# Patient Record
Sex: MALE | Race: WHITE | NOT HISPANIC OR LATINO | Employment: OTHER | URBAN - METROPOLITAN AREA
[De-identification: names, ages, dates, MRNs, and addresses within clinical notes are randomized per-mention and may not be internally consistent; named-entity substitution may affect disease eponyms.]

---

## 2017-04-11 ENCOUNTER — COMMUNICATION - HEALTHEAST (OUTPATIENT)
Dept: FAMILY MEDICINE | Facility: CLINIC | Age: 62
End: 2017-04-11

## 2017-06-29 ENCOUNTER — COMMUNICATION - HEALTHEAST (OUTPATIENT)
Dept: FAMILY MEDICINE | Facility: CLINIC | Age: 62
End: 2017-06-29

## 2017-06-29 DIAGNOSIS — I10 HYPERTENSION: ICD-10-CM

## 2017-09-27 ENCOUNTER — COMMUNICATION - HEALTHEAST (OUTPATIENT)
Dept: FAMILY MEDICINE | Facility: CLINIC | Age: 62
End: 2017-09-27

## 2017-09-27 DIAGNOSIS — I10 HTN (HYPERTENSION): ICD-10-CM

## 2017-09-27 DIAGNOSIS — I10 HYPERTENSION: ICD-10-CM

## 2017-10-08 ENCOUNTER — RECORDS - HEALTHEAST (OUTPATIENT)
Dept: ADMINISTRATIVE | Facility: OTHER | Age: 62
End: 2017-10-08

## 2017-11-02 ENCOUNTER — OFFICE VISIT - HEALTHEAST (OUTPATIENT)
Dept: FAMILY MEDICINE | Facility: CLINIC | Age: 62
End: 2017-11-02

## 2017-11-02 DIAGNOSIS — E78.5 HYPERLIPIDEMIA: ICD-10-CM

## 2017-11-02 DIAGNOSIS — I10 HYPERTENSION: ICD-10-CM

## 2017-11-02 DIAGNOSIS — I10 ESSENTIAL HYPERTENSION, BENIGN: ICD-10-CM

## 2017-11-02 DIAGNOSIS — D86.9 SARCOIDOSIS: ICD-10-CM

## 2017-11-02 DIAGNOSIS — J30.1 ALLERGIC RHINITIS DUE TO POLLEN: ICD-10-CM

## 2017-11-02 DIAGNOSIS — Z00.00 HEALTH CARE MAINTENANCE: ICD-10-CM

## 2017-11-02 DIAGNOSIS — J45.30 MILD PERSISTENT ASTHMA WITHOUT COMPLICATION: ICD-10-CM

## 2017-11-02 DIAGNOSIS — Z12.5 SCREENING PSA (PROSTATE SPECIFIC ANTIGEN): ICD-10-CM

## 2017-11-02 DIAGNOSIS — E78.5 DYSLIPIDEMIA: ICD-10-CM

## 2017-11-02 LAB
CHOLEST SERPL-MCNC: 244 MG/DL
FASTING STATUS PATIENT QL REPORTED: YES
HDLC SERPL-MCNC: 45 MG/DL
LDLC SERPL CALC-MCNC: 172 MG/DL
PSA SERPL-MCNC: 2.7 NG/ML (ref 0–4.5)
TRIGL SERPL-MCNC: 137 MG/DL

## 2017-11-02 ASSESSMENT — MIFFLIN-ST. JEOR: SCORE: 1581.53

## 2017-11-03 ENCOUNTER — COMMUNICATION - HEALTHEAST (OUTPATIENT)
Dept: FAMILY MEDICINE | Facility: CLINIC | Age: 62
End: 2017-11-03

## 2017-11-06 ENCOUNTER — AMBULATORY - HEALTHEAST (OUTPATIENT)
Dept: FAMILY MEDICINE | Facility: CLINIC | Age: 62
End: 2017-11-06

## 2017-11-06 DIAGNOSIS — E87.5 HYPERKALEMIA: ICD-10-CM

## 2017-11-06 DIAGNOSIS — R97.20 RISING PSA LEVEL: ICD-10-CM

## 2017-11-06 DIAGNOSIS — Z12.11 COLON CANCER SCREENING: ICD-10-CM

## 2017-11-13 ENCOUNTER — COMMUNICATION - HEALTHEAST (OUTPATIENT)
Dept: FAMILY MEDICINE | Facility: CLINIC | Age: 62
End: 2017-11-13

## 2017-11-13 DIAGNOSIS — E78.5 HYPERLIPIDEMIA: ICD-10-CM

## 2017-12-08 ENCOUNTER — AMBULATORY - HEALTHEAST (OUTPATIENT)
Dept: LAB | Facility: CLINIC | Age: 62
End: 2017-12-08

## 2017-12-08 DIAGNOSIS — E87.5 HYPERKALEMIA: ICD-10-CM

## 2017-12-09 ENCOUNTER — COMMUNICATION - HEALTHEAST (OUTPATIENT)
Dept: FAMILY MEDICINE | Facility: CLINIC | Age: 62
End: 2017-12-09

## 2017-12-14 ENCOUNTER — RECORDS - HEALTHEAST (OUTPATIENT)
Dept: ADMINISTRATIVE | Facility: OTHER | Age: 62
End: 2017-12-14

## 2017-12-28 ENCOUNTER — COMMUNICATION - HEALTHEAST (OUTPATIENT)
Dept: FAMILY MEDICINE | Facility: CLINIC | Age: 62
End: 2017-12-28

## 2017-12-29 ENCOUNTER — COMMUNICATION - HEALTHEAST (OUTPATIENT)
Dept: FAMILY MEDICINE | Facility: CLINIC | Age: 62
End: 2017-12-29

## 2018-02-07 ENCOUNTER — COMMUNICATION - HEALTHEAST (OUTPATIENT)
Dept: FAMILY MEDICINE | Facility: CLINIC | Age: 63
End: 2018-02-07

## 2018-03-23 ENCOUNTER — RECORDS - HEALTHEAST (OUTPATIENT)
Dept: ADMINISTRATIVE | Facility: OTHER | Age: 63
End: 2018-03-23

## 2018-04-19 ENCOUNTER — COMMUNICATION - HEALTHEAST (OUTPATIENT)
Dept: FAMILY MEDICINE | Facility: CLINIC | Age: 63
End: 2018-04-19

## 2018-09-05 ENCOUNTER — RECORDS - HEALTHEAST (OUTPATIENT)
Dept: ADMINISTRATIVE | Facility: OTHER | Age: 63
End: 2018-09-05

## 2018-11-05 ENCOUNTER — COMMUNICATION - HEALTHEAST (OUTPATIENT)
Dept: FAMILY MEDICINE | Facility: CLINIC | Age: 63
End: 2018-11-05

## 2018-11-05 DIAGNOSIS — E78.5 HYPERLIPIDEMIA: ICD-10-CM

## 2018-11-05 DIAGNOSIS — I10 HYPERTENSION: ICD-10-CM

## 2018-11-06 ENCOUNTER — OFFICE VISIT - HEALTHEAST (OUTPATIENT)
Dept: FAMILY MEDICINE | Facility: CLINIC | Age: 63
End: 2018-11-06

## 2018-11-06 DIAGNOSIS — J45.30 MILD PERSISTENT ASTHMA WITHOUT COMPLICATION: ICD-10-CM

## 2018-11-06 DIAGNOSIS — E78.5 DYSLIPIDEMIA: ICD-10-CM

## 2018-11-06 DIAGNOSIS — D86.9 SARCOIDOSIS: ICD-10-CM

## 2018-11-06 DIAGNOSIS — Z00.00 HEALTH CARE MAINTENANCE: ICD-10-CM

## 2018-11-06 DIAGNOSIS — Z12.5 SCREENING PSA (PROSTATE SPECIFIC ANTIGEN): ICD-10-CM

## 2018-11-06 DIAGNOSIS — I10 ESSENTIAL HYPERTENSION, BENIGN: ICD-10-CM

## 2018-11-06 LAB
ALBUMIN SERPL-MCNC: 4.3 G/DL (ref 3.5–5)
ALP SERPL-CCNC: 88 U/L (ref 45–120)
ALT SERPL W P-5'-P-CCNC: 38 U/L (ref 0–45)
ANION GAP SERPL CALCULATED.3IONS-SCNC: 13 MMOL/L (ref 5–18)
AST SERPL W P-5'-P-CCNC: 33 U/L (ref 0–40)
BILIRUB SERPL-MCNC: 0.9 MG/DL (ref 0–1)
BUN SERPL-MCNC: 20 MG/DL (ref 8–22)
CALCIUM SERPL-MCNC: 10.5 MG/DL (ref 8.5–10.5)
CHLORIDE BLD-SCNC: 99 MMOL/L (ref 98–107)
CHOLEST SERPL-MCNC: 232 MG/DL
CO2 SERPL-SCNC: 27 MMOL/L (ref 22–31)
CREAT SERPL-MCNC: 0.98 MG/DL (ref 0.7–1.3)
FASTING STATUS PATIENT QL REPORTED: YES
GFR SERPL CREATININE-BSD FRML MDRD: >60 ML/MIN/1.73M2
GLUCOSE BLD-MCNC: 92 MG/DL (ref 70–125)
HDLC SERPL-MCNC: 45 MG/DL
LDLC SERPL CALC-MCNC: 166 MG/DL
POTASSIUM BLD-SCNC: 4.9 MMOL/L (ref 3.5–5)
PROT SERPL-MCNC: 7.6 G/DL (ref 6–8)
PSA SERPL-MCNC: 1.5 NG/ML (ref 0–4.5)
SODIUM SERPL-SCNC: 139 MMOL/L (ref 136–145)
TRIGL SERPL-MCNC: 106 MG/DL

## 2018-11-06 ASSESSMENT — MIFFLIN-ST. JEOR: SCORE: 1573.48

## 2018-11-07 ENCOUNTER — COMMUNICATION - HEALTHEAST (OUTPATIENT)
Dept: FAMILY MEDICINE | Facility: CLINIC | Age: 63
End: 2018-11-07

## 2018-11-13 ENCOUNTER — OFFICE VISIT - HEALTHEAST (OUTPATIENT)
Dept: ALLERGY | Facility: CLINIC | Age: 63
End: 2018-11-13

## 2018-11-13 DIAGNOSIS — J45.30 MILD PERSISTENT ASTHMA WITHOUT COMPLICATION: ICD-10-CM

## 2018-11-13 ASSESSMENT — MIFFLIN-ST. JEOR: SCORE: 1572.57

## 2018-11-14 ENCOUNTER — COMMUNICATION - HEALTHEAST (OUTPATIENT)
Dept: FAMILY MEDICINE | Facility: CLINIC | Age: 63
End: 2018-11-14

## 2019-02-07 ENCOUNTER — COMMUNICATION - HEALTHEAST (OUTPATIENT)
Dept: FAMILY MEDICINE | Facility: CLINIC | Age: 64
End: 2019-02-07

## 2019-02-07 DIAGNOSIS — E78.5 HYPERLIPIDEMIA: ICD-10-CM

## 2019-02-14 ENCOUNTER — COMMUNICATION - HEALTHEAST (OUTPATIENT)
Dept: FAMILY MEDICINE | Facility: CLINIC | Age: 64
End: 2019-02-14

## 2019-02-14 DIAGNOSIS — E78.5 HYPERLIPIDEMIA: ICD-10-CM

## 2019-02-14 DIAGNOSIS — I10 HYPERTENSION: ICD-10-CM

## 2019-10-15 ENCOUNTER — RECORDS - HEALTHEAST (OUTPATIENT)
Dept: ADMINISTRATIVE | Facility: OTHER | Age: 64
End: 2019-10-15

## 2019-11-03 ENCOUNTER — COMMUNICATION - HEALTHEAST (OUTPATIENT)
Dept: FAMILY MEDICINE | Facility: CLINIC | Age: 64
End: 2019-11-03

## 2019-11-03 DIAGNOSIS — I10 HYPERTENSION: ICD-10-CM

## 2019-11-03 DIAGNOSIS — E78.5 HYPERLIPIDEMIA: ICD-10-CM

## 2019-12-05 ENCOUNTER — OFFICE VISIT - HEALTHEAST (OUTPATIENT)
Dept: FAMILY MEDICINE | Facility: CLINIC | Age: 64
End: 2019-12-05

## 2019-12-05 DIAGNOSIS — Z12.5 SCREENING PSA (PROSTATE SPECIFIC ANTIGEN): ICD-10-CM

## 2019-12-05 DIAGNOSIS — I10 ESSENTIAL HYPERTENSION, BENIGN: ICD-10-CM

## 2019-12-05 DIAGNOSIS — I10 HYPERTENSION: ICD-10-CM

## 2019-12-05 DIAGNOSIS — E78.5 DYSLIPIDEMIA: ICD-10-CM

## 2019-12-05 LAB
ALBUMIN SERPL-MCNC: 4.5 G/DL (ref 3.5–5)
ALP SERPL-CCNC: 82 U/L (ref 45–120)
ALT SERPL W P-5'-P-CCNC: 37 U/L (ref 0–45)
ANION GAP SERPL CALCULATED.3IONS-SCNC: 14 MMOL/L (ref 5–18)
AST SERPL W P-5'-P-CCNC: 31 U/L (ref 0–40)
BILIRUB SERPL-MCNC: 1 MG/DL (ref 0–1)
BUN SERPL-MCNC: 22 MG/DL (ref 8–22)
CALCIUM SERPL-MCNC: 10.3 MG/DL (ref 8.5–10.5)
CHLORIDE BLD-SCNC: 103 MMOL/L (ref 98–107)
CHOLEST SERPL-MCNC: 228 MG/DL
CO2 SERPL-SCNC: 24 MMOL/L (ref 22–31)
CREAT SERPL-MCNC: 0.94 MG/DL (ref 0.7–1.3)
FASTING STATUS PATIENT QL REPORTED: YES
GFR SERPL CREATININE-BSD FRML MDRD: >60 ML/MIN/1.73M2
GLUCOSE BLD-MCNC: 90 MG/DL (ref 70–125)
HDLC SERPL-MCNC: 44 MG/DL
LDLC SERPL CALC-MCNC: 159 MG/DL
POTASSIUM BLD-SCNC: 4.7 MMOL/L (ref 3.5–5)
PROT SERPL-MCNC: 7.6 G/DL (ref 6–8)
PSA SERPL-MCNC: 1.7 NG/ML (ref 0–4.5)
SODIUM SERPL-SCNC: 141 MMOL/L (ref 136–145)
TRIGL SERPL-MCNC: 123 MG/DL

## 2019-12-05 ASSESSMENT — MIFFLIN-ST. JEOR: SCORE: 1593.43

## 2019-12-06 ENCOUNTER — COMMUNICATION - HEALTHEAST (OUTPATIENT)
Dept: FAMILY MEDICINE | Facility: CLINIC | Age: 64
End: 2019-12-06

## 2020-01-02 ENCOUNTER — OFFICE VISIT - HEALTHEAST (OUTPATIENT)
Dept: FAMILY MEDICINE | Facility: CLINIC | Age: 65
End: 2020-01-02

## 2020-01-02 ENCOUNTER — COMMUNICATION - HEALTHEAST (OUTPATIENT)
Dept: FAMILY MEDICINE | Facility: CLINIC | Age: 65
End: 2020-01-02

## 2020-01-02 DIAGNOSIS — I10 ESSENTIAL HYPERTENSION, BENIGN: ICD-10-CM

## 2020-01-02 LAB
ANION GAP SERPL CALCULATED.3IONS-SCNC: 13 MMOL/L (ref 5–18)
BUN SERPL-MCNC: 28 MG/DL (ref 8–22)
CALCIUM SERPL-MCNC: 10.1 MG/DL (ref 8.5–10.5)
CHLORIDE BLD-SCNC: 101 MMOL/L (ref 98–107)
CO2 SERPL-SCNC: 27 MMOL/L (ref 22–31)
CREAT SERPL-MCNC: 1.06 MG/DL (ref 0.7–1.3)
GFR SERPL CREATININE-BSD FRML MDRD: >60 ML/MIN/1.73M2
GLUCOSE BLD-MCNC: 137 MG/DL (ref 70–125)
POTASSIUM BLD-SCNC: 4.2 MMOL/L (ref 3.5–5)
SODIUM SERPL-SCNC: 141 MMOL/L (ref 136–145)

## 2020-04-27 ENCOUNTER — COMMUNICATION - HEALTHEAST (OUTPATIENT)
Dept: FAMILY MEDICINE | Facility: CLINIC | Age: 65
End: 2020-04-27

## 2020-04-27 DIAGNOSIS — E78.5 HYPERLIPIDEMIA: ICD-10-CM

## 2020-04-27 DIAGNOSIS — I10 HYPERTENSION: ICD-10-CM

## 2020-07-21 ENCOUNTER — RECORDS - HEALTHEAST (OUTPATIENT)
Dept: ADMINISTRATIVE | Facility: OTHER | Age: 65
End: 2020-07-21

## 2020-12-28 ENCOUNTER — COMMUNICATION - HEALTHEAST (OUTPATIENT)
Dept: FAMILY MEDICINE | Facility: CLINIC | Age: 65
End: 2020-12-28

## 2020-12-28 DIAGNOSIS — I10 HYPERTENSION: ICD-10-CM

## 2021-01-14 ENCOUNTER — COMMUNICATION - HEALTHEAST (OUTPATIENT)
Dept: FAMILY MEDICINE | Facility: CLINIC | Age: 66
End: 2021-01-14

## 2021-01-14 DIAGNOSIS — E78.5 HYPERLIPIDEMIA: ICD-10-CM

## 2021-01-17 ENCOUNTER — COMMUNICATION - HEALTHEAST (OUTPATIENT)
Dept: FAMILY MEDICINE | Facility: CLINIC | Age: 66
End: 2021-01-17

## 2021-01-17 DIAGNOSIS — I10 HYPERTENSION: ICD-10-CM

## 2021-03-26 ENCOUNTER — COMMUNICATION - HEALTHEAST (OUTPATIENT)
Dept: FAMILY MEDICINE | Facility: CLINIC | Age: 66
End: 2021-03-26

## 2021-03-26 DIAGNOSIS — I10 HYPERTENSION: ICD-10-CM

## 2021-04-05 ENCOUNTER — OFFICE VISIT - HEALTHEAST (OUTPATIENT)
Dept: FAMILY MEDICINE | Facility: CLINIC | Age: 66
End: 2021-04-05

## 2021-04-05 ENCOUNTER — AMBULATORY - HEALTHEAST (OUTPATIENT)
Dept: MULTI SPECIALTY CLINIC | Facility: CLINIC | Age: 66
End: 2021-04-05

## 2021-04-05 DIAGNOSIS — Z12.5 SCREENING PSA (PROSTATE SPECIFIC ANTIGEN): ICD-10-CM

## 2021-04-05 DIAGNOSIS — Z00.00 HEALTH CARE MAINTENANCE: ICD-10-CM

## 2021-04-05 DIAGNOSIS — I10 HYPERTENSION: ICD-10-CM

## 2021-04-05 DIAGNOSIS — Z12.11 SCREEN FOR COLON CANCER: ICD-10-CM

## 2021-04-05 DIAGNOSIS — E78.5 HYPERLIPIDEMIA: ICD-10-CM

## 2021-04-05 LAB
ALBUMIN SERPL-MCNC: 4.4 G/DL (ref 3.5–5)
ALP SERPL-CCNC: 97 U/L (ref 45–120)
ALT SERPL W P-5'-P-CCNC: 38 U/L (ref 0–45)
ANION GAP SERPL CALCULATED.3IONS-SCNC: 10 MMOL/L (ref 5–18)
AST SERPL W P-5'-P-CCNC: 26 U/L (ref 0–40)
BILIRUB SERPL-MCNC: 0.8 MG/DL (ref 0–1)
BUN SERPL-MCNC: 26 MG/DL (ref 8–22)
CALCIUM SERPL-MCNC: 9.5 MG/DL (ref 8.5–10.5)
CHLORIDE BLD-SCNC: 103 MMOL/L (ref 98–107)
CHOLEST SERPL-MCNC: 192 MG/DL
CO2 SERPL-SCNC: 26 MMOL/L (ref 22–31)
CREAT SERPL-MCNC: 0.95 MG/DL (ref 0.7–1.3)
FASTING STATUS PATIENT QL REPORTED: YES
GFR SERPL CREATININE-BSD FRML MDRD: >60 ML/MIN/1.73M2
GLUCOSE BLD-MCNC: 94 MG/DL (ref 70–125)
HDLC SERPL-MCNC: 41 MG/DL
LDLC SERPL CALC-MCNC: 126 MG/DL
POTASSIUM BLD-SCNC: 4.2 MMOL/L (ref 3.5–5)
PROT SERPL-MCNC: 7.2 G/DL (ref 6–8)
PSA SERPL-MCNC: 2.7 NG/ML (ref 0–4.5)
SODIUM SERPL-SCNC: 139 MMOL/L (ref 136–145)
TRIGL SERPL-MCNC: 127 MG/DL

## 2021-04-05 RX ORDER — HYDROCHLOROTHIAZIDE 25 MG/1
TABLET ORAL
Qty: 90 TABLET | Refills: 3 | Status: SHIPPED | OUTPATIENT
Start: 2021-04-05 | End: 2022-06-07

## 2021-04-05 RX ORDER — PRAVASTATIN SODIUM 40 MG
40 TABLET ORAL AT BEDTIME
Qty: 90 TABLET | Refills: 3 | Status: SHIPPED | OUTPATIENT
Start: 2021-04-05 | End: 2022-04-05

## 2021-04-05 RX ORDER — LISINOPRIL 20 MG/1
TABLET ORAL
Qty: 90 TABLET | Refills: 3 | Status: SHIPPED | OUTPATIENT
Start: 2021-04-05 | End: 2022-04-01

## 2021-04-05 ASSESSMENT — MIFFLIN-ST. JEOR: SCORE: 1570.87

## 2021-04-06 ENCOUNTER — COMMUNICATION - HEALTHEAST (OUTPATIENT)
Dept: FAMILY MEDICINE | Facility: CLINIC | Age: 66
End: 2021-04-06

## 2021-04-06 ENCOUNTER — AMBULATORY - HEALTHEAST (OUTPATIENT)
Dept: FAMILY MEDICINE | Facility: CLINIC | Age: 66
End: 2021-04-06

## 2021-04-06 DIAGNOSIS — D72.829 LEUKOCYTOSIS, UNSPECIFIED TYPE: ICD-10-CM

## 2021-04-09 LAB — COLOGUARD-ABSTRACT: NEGATIVE

## 2021-04-15 ENCOUNTER — AMBULATORY - HEALTHEAST (OUTPATIENT)
Dept: FAMILY MEDICINE | Facility: CLINIC | Age: 66
End: 2021-04-15

## 2021-04-15 ENCOUNTER — COMMUNICATION - HEALTHEAST (OUTPATIENT)
Dept: FAMILY MEDICINE | Facility: CLINIC | Age: 66
End: 2021-04-15

## 2021-04-15 DIAGNOSIS — R97.20 RISING PSA LEVEL: ICD-10-CM

## 2021-05-27 ENCOUNTER — RECORDS - HEALTHEAST (OUTPATIENT)
Dept: ADMINISTRATIVE | Facility: CLINIC | Age: 66
End: 2021-05-27

## 2021-05-28 ASSESSMENT — ASTHMA QUESTIONNAIRES: ACT_TOTALSCORE: 25

## 2021-05-29 ENCOUNTER — HEALTH MAINTENANCE LETTER (OUTPATIENT)
Age: 66
End: 2021-05-29

## 2021-05-30 ENCOUNTER — RECORDS - HEALTHEAST (OUTPATIENT)
Dept: ADMINISTRATIVE | Facility: CLINIC | Age: 66
End: 2021-05-30

## 2021-05-31 VITALS — HEIGHT: 70 IN | BODY MASS INDEX: 25.07 KG/M2 | WEIGHT: 175.1 LBS

## 2021-06-02 ENCOUNTER — RECORDS - HEALTHEAST (OUTPATIENT)
Dept: ADMINISTRATIVE | Facility: CLINIC | Age: 66
End: 2021-06-02

## 2021-06-02 VITALS — BODY MASS INDEX: 24.91 KG/M2 | HEIGHT: 70 IN | WEIGHT: 174 LBS

## 2021-06-02 VITALS — HEIGHT: 70 IN | WEIGHT: 174.2 LBS | BODY MASS INDEX: 24.94 KG/M2

## 2021-06-02 NOTE — TELEPHONE ENCOUNTER
Refill Approved    Rx renewed per Medication Renewal Policy. Medication was last renewed on .  hydroCHLOROthiazide (HYDRODIURIL) 12.5 MG tablet 90 tablet 2 2/18/2019     lisinopril (PRINIVIL,ZESTRIL) 20 MG tablet 90 tablet 2 2/18/2019     pravastatin (PRAVACHOL) 40 MG tablet 7 tablet 0 2/14/2019     Marion Guadalupe, [facility] Triage/Med Refill 11/3/2019     Requested Prescriptions   Pending Prescriptions Disp Refills     lisinopril (PRINIVIL,ZESTRIL) 20 MG tablet [Pharmacy Med Name: LISINOPRIL TAB 20MG] 90 tablet 2     Sig: TAKE 1 TABLET DAILY       Ace Inhibitors Refill Protocol Passed - 11/3/2019  9:38 AM        Passed - PCP or prescribing provider visit in past 12 months       Last office visit with prescriber/PCP: 10/14/2016 Randy Moreno MD OR same dept: Visit date not found OR same specialty: 10/14/2016 Randy Moreno MD  Last physical: 11/6/2018 Last MTM visit: Visit date not found   Next visit within 3 mo: Visit date not found  Next physical within 3 mo: Visit date not found  Prescriber OR PCP: Randy Moreno MD  Last diagnosis associated with med order: 1. Hypertension  - lisinopril (PRINIVIL,ZESTRIL) 20 MG tablet [Pharmacy Med Name: LISINOPRIL TAB 20MG]; TAKE 1 TABLET DAILY  Dispense: 90 tablet; Refill: 2  - hydroCHLOROthiazide (HYDRODIURIL) 12.5 MG tablet [Pharmacy Med Name: HYDROCHLOROT TAB 12.5MG]; TAKE 1 TABLET DAILY  Dispense: 90 tablet; Refill: 2    2. Hyperlipidemia  - pravastatin (PRAVACHOL) 40 MG tablet [Pharmacy Med Name: PRAVASTATIN  TAB 40MG]; TAKE 1 TABLET DAILY AT     BEDTIME  Dispense: 90 tablet; Refill: 2    If protocol passes may refill for 12 months if within 3 months of last provider visit (or a total of 15 months).             Passed - Serum Potassium in past 12 months     Lab Results   Component Value Date    Potassium 4.9 11/06/2018             Passed - Blood pressure filed in past 12 months     BP Readings from Last 1 Encounters:   11/13/18 120/70              Passed - Serum Creatinine in past 12 months     Creatinine   Date Value Ref Range Status   11/06/2018 0.98 0.70 - 1.30 mg/dL Final             pravastatin (PRAVACHOL) 40 MG tablet [Pharmacy Med Name: PRAVASTATIN  TAB 40MG] 90 tablet 2     Sig: TAKE 1 TABLET DAILY AT     BEDTIME       Statins Refill Protocol (Hmg CoA Reductase Inhibitors) Passed - 11/3/2019  9:38 AM        Passed - PCP or prescribing provider visit in past 12 months      Last office visit with prescriber/PCP: 10/14/2016 Randy Moreno MD OR same dept: Visit date not found OR same specialty: 10/14/2016 Randy Moreno MD  Last physical: 11/6/2018 Last MTM visit: Visit date not found   Next visit within 3 mo: Visit date not found  Next physical within 3 mo: Visit date not found  Prescriber OR PCP: Randy Moreno MD  Last diagnosis associated with med order: 1. Hypertension  - lisinopril (PRINIVIL,ZESTRIL) 20 MG tablet [Pharmacy Med Name: LISINOPRIL TAB 20MG]; TAKE 1 TABLET DAILY  Dispense: 90 tablet; Refill: 2  - hydroCHLOROthiazide (HYDRODIURIL) 12.5 MG tablet [Pharmacy Med Name: HYDROCHLOROT TAB 12.5MG]; TAKE 1 TABLET DAILY  Dispense: 90 tablet; Refill: 2    2. Hyperlipidemia  - pravastatin (PRAVACHOL) 40 MG tablet [Pharmacy Med Name: PRAVASTATIN  TAB 40MG]; TAKE 1 TABLET DAILY AT     BEDTIME  Dispense: 90 tablet; Refill: 2    If protocol passes may refill for 12 months if within 3 months of last provider visit (or a total of 15 months).             hydroCHLOROthiazide (HYDRODIURIL) 12.5 MG tablet [Pharmacy Med Name: HYDROCHLOROT TAB 12.5MG] 90 tablet 2     Sig: TAKE 1 TABLET DAILY       Diuretics/Combination Diuretics Refill Protocol  Passed - 11/3/2019  9:38 AM        Passed - Visit with PCP or prescribing provider visit in past 12 months     Last office visit with prescriber/PCP: 10/14/2016 Randy Moreno MD OR same dept: Visit date not found OR same specialty: 10/14/2016 Randy Moreno MD   Last physical: 11/6/2018 Last MTM visit: Visit date not found   Next visit within 3 mo: Visit date not found  Next physical within 3 mo: Visit date not found  Prescriber OR PCP: Randy Moreno MD  Last diagnosis associated with med order: 1. Hypertension  - lisinopril (PRINIVIL,ZESTRIL) 20 MG tablet [Pharmacy Med Name: LISINOPRIL TAB 20MG]; TAKE 1 TABLET DAILY  Dispense: 90 tablet; Refill: 2  - hydroCHLOROthiazide (HYDRODIURIL) 12.5 MG tablet [Pharmacy Med Name: HYDROCHLOROT TAB 12.5MG]; TAKE 1 TABLET DAILY  Dispense: 90 tablet; Refill: 2    2. Hyperlipidemia  - pravastatin (PRAVACHOL) 40 MG tablet [Pharmacy Med Name: PRAVASTATIN  TAB 40MG]; TAKE 1 TABLET DAILY AT     BEDTIME  Dispense: 90 tablet; Refill: 2    If protocol passes may refill for 12 months if within 3 months of last provider visit (or a total of 15 months).             Passed - Serum Potassium in past 12 months      Lab Results   Component Value Date    Potassium 4.9 11/06/2018             Passed - Serum Sodium in past 12 months      Lab Results   Component Value Date    Sodium 139 11/06/2018             Passed - Blood pressure on file in past 12 months     BP Readings from Last 1 Encounters:   11/13/18 120/70             Passed - Serum Creatinine in past 12 months      Creatinine   Date Value Ref Range Status   11/06/2018 0.98 0.70 - 1.30 mg/dL Final

## 2021-06-03 ENCOUNTER — RECORDS - HEALTHEAST (OUTPATIENT)
Dept: ADMINISTRATIVE | Facility: CLINIC | Age: 66
End: 2021-06-03

## 2021-06-04 VITALS
BODY MASS INDEX: 25.76 KG/M2 | RESPIRATION RATE: 16 BRPM | WEIGHT: 177 LBS | HEART RATE: 70 BPM | DIASTOLIC BLOOD PRESSURE: 81 MMHG | SYSTOLIC BLOOD PRESSURE: 134 MMHG | TEMPERATURE: 96.9 F

## 2021-06-04 VITALS
SYSTOLIC BLOOD PRESSURE: 163 MMHG | TEMPERATURE: 96.1 F | BODY MASS INDEX: 25.57 KG/M2 | OXYGEN SATURATION: 96 % | HEIGHT: 70 IN | RESPIRATION RATE: 14 BRPM | DIASTOLIC BLOOD PRESSURE: 86 MMHG | HEART RATE: 62 BPM | WEIGHT: 178.6 LBS

## 2021-06-04 NOTE — PATIENT INSTRUCTIONS - HE
In one year either repeat Cologuard or get a colonoscopy    Fasting labs including PSA    Increase HYDROCHLOROTHIAZIDE TO 25 MG AND TAKE IN THE MORNING  Nurse BP check in one week  appt in 3 weeks

## 2021-06-04 NOTE — PROGRESS NOTES
DIAGNOSIS:  1. Benign Essential Hypertension  Basic Metabolic Panel       PLAN:     Recheck BMP            HPI:   Doing ok with the increased dose of the water pill.  Did change to taking it in the morning which has decrased his getting up at night.             Current Outpatient Medications on File Prior to Visit   Medication Sig Dispense Refill     aspirin 81 mg chewable tablet Chew 81 mg daily.       hydroCHLOROthiazide (HYDRODIURIL) 25 MG tablet Take 1 tablet (25 mg total) by mouth daily. 90 tablet 3     lisinopril (PRINIVIL,ZESTRIL) 20 MG tablet Take 1 tablet (20 mg total) by mouth daily. 90 tablet 1     MULTIVITAMIN (MULTIPLE VITAMINS ORAL) Take by mouth.       pravastatin (PRAVACHOL) 40 MG tablet Take 1 tablet (40 mg total) by mouth at bedtime. 90 tablet 1     triamcinolone (NASACORT AQ) 55 mcg nasal inhaler 2 sprays into each nostril daily.       No current facility-administered medications on file prior to visit.        Pmh: reviewed  Psh: reviewed  Allergy:  reviewed      EXAM:    /81   Pulse 70   Temp 96.9  F (36.1  C) (Oral)   Resp 16   Wt 177 lb (80.3 kg)   BMI 25.76 kg/m    GEN:   ALERT, NAD, ORIENTED TIMES THREE  NECK: SUPPLE WITHOUT ADENOPATHY OR THYROMEGALY  LUNGS: CTA  COR: RRR WITHOUT MURMUR  SKIN: NO RASH , ULCERS OR LESIONS NOTED  EXT: WITHOUT EDEMA/SWELLING    No results found for this or any previous visit (from the past 168 hour(s)).    Results for orders placed or performed in visit on 12/08/17   Basic Metabolic Panel   Result Value Ref Range    Sodium 141 136 - 145 mmol/L    Potassium 4.8 3.5 - 5.0 mmol/L    Chloride 104 98 - 107 mmol/L    CO2 27 22 - 31 mmol/L    Anion Gap, Calculation 10 5 - 18 mmol/L    Glucose 102 70 - 125 mg/dL    Calcium 9.4 8.5 - 10.5 mg/dL    BUN 25 (H) 8 - 22 mg/dL    Creatinine 0.91 0.70 - 1.30 mg/dL    GFR MDRD Af Amer >60 >60 mL/min/1.73m2    GFR MDRD Non Af Amer >60 >60 mL/min/1.73m2

## 2021-06-04 NOTE — PROGRESS NOTES
Assessment:      Healthy male exam.    Encounter Diagnoses   Name Primary?     Benign Essential Hypertension                         The diagnosis was confirmed.  The condition is INADEQUATELY controlled on LISINOPRIL, HCTZ and no side effects  have been noted.  The appropriate follow up labs  ( CMP )have been ordered  (OR ARE UP TO DATE) and medication refills ordered if requested.    INCREASE HCTZ TO 25 MG  AND TAKE EVERY MORNING  NURSE BP SASHA K ONE WEEK  APPT 3 WEEKS   Yes     Dyslipidemia                         The diagnosis was confirmed.  The condition is stable/controlled on PRAVASTSTIN and no side effects  have been noted.  The appropriate follow up labs  ( FLP, CMP )have been ordered  (OR ARE UP TO DATE) and medication refills ordered if requested.        Screening PSA (prostate specific antigen)      Hypertension          Plan:       All questions answered.    PLAN:   In one year either repeat Cologuard or get a colonoscopy    Fasting labs including PSA    Increase HYDROCHLOROTHIAZIDE TO 25 MG AND TAKE IN THE MORNING  Nurse BP check in one week  appt in 3 weeks       Subjective:      Antony Ferguson is a 64 y.o. male who presents for an annual exam. The patient reports that there is not domestic violence in his life.   Tolerating pravastatin.  Takes hctz at night without problem.    Healthy Habits:   Regular Exercise: Yes  Sunscreen Use: Yes  Healthy Diet: Yes  Dental Visits Regularly: Yes  Seat Belt: Yes  Sexually active: Yes  Monthly Self Testicular Exams:  Yes  Hemoccults: N/A   Flex Sig: N/A  Colonoscopy: COLOGUARD DEC 2017 AND REPEAT DUE IN 2020  Lipid Profile: Yes  Glucose Screen: Yes  Prevention of Osteoporosis: Yes  Last Dexa: N/A  Guns at Home:  No      Immunization History   Administered Date(s) Administered     DTaP, historic 06/18/2007     Hep A, Adult IM (19yr & older) 02/09/2016, 08/16/2016     Hep B, Adult 02/09/2016, 03/14/2016, 08/16/2016     INFLUENZA,RECOMBINANT,INJ,PF QUADRIVALENT  18+YRS 11/07/2018     Influenza, inj, historic,unspecified 10/31/2019     Influenza, seasonal,quad inj 6-35 mos 11/02/2014     Influenza,seasonal,quad inj =/> 6months 10/26/2015, 10/14/2016, 11/02/2017     Pneumo Conj 13-V (2010&after) 10/26/2015     Pneumo Polysac 23-V 07/29/2011, 10/14/2016     Tdap 10/14/2016     ZOSTER, RECOMBINANT, IM 04/01/2019, 07/04/2019     Immunization status: up to date and documented.    No exam data present    Current Outpatient Medications   Medication Sig Dispense Refill     aspirin 81 mg chewable tablet Chew 81 mg daily.       hydroCHLOROthiazide (HYDRODIURIL) 25 MG tablet Take 1 tablet (25 mg total) by mouth daily. 90 tablet 3     lisinopril (PRINIVIL,ZESTRIL) 20 MG tablet Take 1 tablet (20 mg total) by mouth daily. 90 tablet 1     MULTIVITAMIN (MULTIPLE VITAMINS ORAL) Take by mouth.       pravastatin (PRAVACHOL) 40 MG tablet Take 1 tablet (40 mg total) by mouth at bedtime. 90 tablet 1     triamcinolone (NASACORT AQ) 55 mcg nasal inhaler 2 sprays into each nostril daily.       No current facility-administered medications for this visit.      Past Medical History:   Diagnosis Date     Hypertension      Past Surgical History:   Procedure Laterality Date     [place] BIOPSY/EXCISION, LYMPH NODE(S)      Description: Biopsy Lymph Node;  Recorded: 09/02/2014;     Atorvastatin and Crestor [rosuvastatin]  Family History   Problem Relation Age of Onset     Heart disease Mother      Dementia Mother      Hyperlipidemia Father      Hypertension Father      No Medical Problems Sister      No Medical Problems Brother      No Medical Problems Daughter      No Medical Problems Son      Heart disease Brother         Pacemaker     Diabetes Brother      Social History     Socioeconomic History     Marital status:      Spouse name: Not on file     Number of children: Not on file     Years of education: Not on file     Highest education level: Not on file   Occupational History     Not on file   Social  "Needs     Financial resource strain: Not on file     Food insecurity:     Worry: Not on file     Inability: Not on file     Transportation needs:     Medical: Not on file     Non-medical: Not on file   Tobacco Use     Smoking status: Never Smoker     Smokeless tobacco: Never Used   Substance and Sexual Activity     Alcohol use: Yes     Comment: Two beers per month.  Sometimes goes a few months without.     Drug use: No     Sexual activity: Yes     Partners: Female   Lifestyle     Physical activity:     Days per week: Not on file     Minutes per session: Not on file     Stress: Not on file   Relationships     Social connections:     Talks on phone: Not on file     Gets together: Not on file     Attends [employer] service: Not on file     Active member of club or organization: Not on file     Attends meetings of clubs or organizations: Not on file     Relationship status: Not on file     Intimate partner violence:     Fear of current or ex partner: Not on file     Emotionally abused: Not on file     Physically abused: Not on file     Forced sexual activity: Not on file   Other Topics Concern     Not on file   Social History Narrative     Not on file       Review of Systems  General:  Denies problem  Eyes: Denies problem  Ears/Nose/Throat: Denies problem  Cardiovascular: Denies problem  Respiratory:  Denies problem  Gastrointestinal:  Denies problem  Genitourinary: Denies problem  Musculoskeletal:  Denies problem  Skin: Denies problem  Neurologic: Denies problem  Psychiatric: Denies problem  Endocrine: Denies problem  Heme/Lymphatic: Denies problem   Allergic/Immunologic: Denies problem        Objective:     Vitals:    12/05/19 1135 12/05/19 1151   BP: 161/81 163/86   Pulse: 62    Resp: 14    Temp: (!) 96.1  F (35.6  C)    TempSrc: Oral    SpO2: 96%    Weight: 178 lb 9.6 oz (81 kg)    Height: 5' 9.5\" (1.765 m)      Body mass index is 26 kg/m .    Physical  General Appearance: Alert, cooperative, no distress, appears " stated age  Head: Normocephalic, without obvious abnormality, atraumatic  Eyes: PERRL, conjunctiva/corneas clear, EOM's intact  Ears: Normal TM's and external ear canals, both ears  Nose: Nares normal, septum midline,mucosa normal, no drainage  Throat: Lips, mucosa, and tongue normal; teeth and gums normal  Neck: Supple, symmetrical, trachea midline, no adenopathy;  thyroid: not enlarged, symmetric, no tenderness/mass/nodules; no carotid bruit or JVD  Back: Symmetric, no curvature, ROM normal, no CVA tenderness  Lungs: Clear to auscultation bilaterally, respirations unlabored  Heart: Regular rate and rhythm, S1 and S2 normal, no murmur, rub, or gallop,  Abdomen: Soft, non-tender, bowel sounds active all four quadrants,  no masses, no organomegaly  Genitourinary: Penis normal. Right testis is descended. Left testis is descended.   PROSTATE SMOOTH SYMMETRIC WITHOUT NODULARITY, TX, OR FIRMNESS  Musculoskeletal: Normal range of motion. No joint swelling or deformity.   Extremities: Extremities normal, atraumatic, no cyanosis or edema  Skin: Skin color, texture, turgor normal, no rashes or lesions  Lymph nodes: Cervical, supraclavicular, and axillary nodes normal  Neurologic: He is alert. He has normal reflexes.   Psychiatric: He has a normal mood and affect.      Lab Results   Component Value Date    PSA 1.5 11/06/2018    PSA 2.7 11/02/2017    PSA 1.7 09/02/2014     Vitamin D, Total (25-Hydroxy)   Date Value Ref Range Status   11/02/2017 54.7 30.0 - 80.0 ng/mL Final

## 2021-06-05 VITALS
BODY MASS INDEX: 25.84 KG/M2 | TEMPERATURE: 97.6 F | HEART RATE: 67 BPM | WEIGHT: 174.5 LBS | RESPIRATION RATE: 16 BRPM | HEIGHT: 69 IN | DIASTOLIC BLOOD PRESSURE: 88 MMHG | SYSTOLIC BLOOD PRESSURE: 175 MMHG

## 2021-06-07 NOTE — TELEPHONE ENCOUNTER
Refill Approved    Rx renewed per Medication Renewal Policy. Medication was last renewed on 11/3/19.    Justine Lockhart, [facility] Triage/Med Refill 4/29/2020     Requested Prescriptions   Pending Prescriptions Disp Refills     pravastatin (PRAVACHOL) 40 MG tablet [Pharmacy Med Name: PRAVASTATIN TABS 40MG] 90 tablet 3     Sig: TAKE 1 TABLET DAILY AT     BEDTIME       Statins Refill Protocol (Hmg CoA Reductase Inhibitors) Passed - 4/27/2020 10:49 AM        Passed - PCP or prescribing provider visit in past 12 months      Last office visit with prescriber/PCP: 1/2/2020 Randy Moreno MD OR same dept: 1/2/2020 Randy Moreno MD OR same specialty: 1/2/2020 Randy Moreno MD  Last physical: 12/5/2019 Last MTM visit: Visit date not found   Next visit within 3 mo: Visit date not found  Next physical within 3 mo: Visit date not found  Prescriber OR PCP: Randy Moreno MD  Last diagnosis associated with med order: 1. Hyperlipidemia  - pravastatin (PRAVACHOL) 40 MG tablet [Pharmacy Med Name: PRAVASTATIN TABS 40MG]; TAKE 1 TABLET DAILY AT     BEDTIME  Dispense: 90 tablet; Refill: 3    2. Hypertension  - lisinopriL (PRINIVIL,ZESTRIL) 20 MG tablet [Pharmacy Med Name: LISINOPRIL TABS 20MG]; TAKE 1 TABLET DAILY  Dispense: 90 tablet; Refill: 3    If protocol passes may refill for 12 months if within 3 months of last provider visit (or a total of 15 months).                lisinopriL (PRINIVIL,ZESTRIL) 20 MG tablet [Pharmacy Med Name: LISINOPRIL TABS 20MG] 90 tablet 3     Sig: TAKE 1 TABLET DAILY       Ace Inhibitors Refill Protocol Passed - 4/27/2020 10:49 AM        Passed - PCP or prescribing provider visit in past 12 months       Last office visit with prescriber/PCP: 1/2/2020 Randy Moreno MD OR same dept: 1/2/2020 Randy Moreno MD OR same specialty: 1/2/2020 Randy Moreno MD  Last physical: 12/5/2019 Last MTM visit: Visit date not found   Next visit within 3  mo: Visit date not found  Next physical within 3 mo: Visit date not found  Prescriber OR PCP: Randy Moreno MD  Last diagnosis associated with med order: 1. Hyperlipidemia  - pravastatin (PRAVACHOL) 40 MG tablet [Pharmacy Med Name: PRAVASTATIN TABS 40MG]; TAKE 1 TABLET DAILY AT     BEDTIME  Dispense: 90 tablet; Refill: 3    2. Hypertension  - lisinopriL (PRINIVIL,ZESTRIL) 20 MG tablet [Pharmacy Med Name: LISINOPRIL TABS 20MG]; TAKE 1 TABLET DAILY  Dispense: 90 tablet; Refill: 3    If protocol passes may refill for 12 months if within 3 months of last provider visit (or a total of 15 months).             Passed - Serum Potassium in past 12 months     Lab Results   Component Value Date    Potassium 4.2 01/02/2020             Passed - Blood pressure filed in past 12 months     BP Readings from Last 1 Encounters:   01/02/20 134/81             Passed - Serum Creatinine in past 12 months     Creatinine   Date Value Ref Range Status   01/02/2020 1.06 0.70 - 1.30 mg/dL Final

## 2021-06-13 NOTE — PROGRESS NOTES
"Assessment:      Healthy male exam.    Encounter Diagnoses   Name Primary?     Hypertension      Hyperlipidemia      Mild persistent asthma without complication Yes     Benign Essential Hypertension      Dyslipidemia      Allergic rhinitis due to pollen      Sarcoidosis - found on lymph node biopsy      Screening PSA (prostate specific antigen)      Health care maintenance           Plan:       All questions answered.    Patient Instructions   Flu vaccine    Colonoscopy in one year OR Cologuard    Fasting labs and PSA       Subjective:      Antony Ferguson is a 62 y.o. male who presents for an annual exam. The patient reports that there is not domestic violence in his life.     Healthy Habits:   Regular Exercise: Yes  Sunscreen Use: Yes  Healthy Diet: Yes  Dental Visits Regularly: Yes  Seat Belt: Yes  Sexually active: Yes  Monthly Self Testicular Exams:  No  Hemoccults: N/A  Flex Sig: N/A  Colonoscopy: Yes; 9-15-08  Lipid Profile: Yes  Glucose Screen: Yes  Prevention of Osteoporosis: Yes  Last Dexa: N/A  Guns at Home:  No      Immunization History   Administered Date(s) Administered     DTaP, historic 06/18/2007     Hep A, Adult IM (19yr & older) 02/09/2016, 08/16/2016     Hep B, Adult 02/09/2016, 03/14/2016, 08/16/2016     Influenza, seasonal,quad inj 36+ mos 10/26/2015, 10/14/2016     Influenza, seasonal,quad inj 6-35 mos 11/02/2014     Pneumo Conj 13-V (2010&after) 10/26/2015     Pneumo Polysac 23-V 10/14/2016     Tdap 10/14/2016     Immunization status: up to date and documented.    No exam data present    Current Outpatient Prescriptions   Medication Sig Dispense Refill     albuterol (PROVENTIL HFA;VENTOLIN HFA) 90 mcg/actuation inhaler Inhale 2 puffs every 4 (four) hours as needed for wheezing (He has had this \"for years\" and has never had to use it.  [facility]ink[facility] - 6/18/15).       aspirin 81 mg chewable tablet Chew 81 mg daily.       fexofenadine (ALLEGRA) 180 MG tablet Take 90 mg by mouth daily.       FLOVENT " HFA 44 mcg/actuation inhaler Inhale 2 puffs 2 (two) times a day.        hydroCHLOROthiazide (HYDRODIURIL) 12.5 MG tablet TAKE ONE TABLET BY MOUTH EVERY DAY 90 tablet 3     KRILL OIL ORAL Take 1,000 mg by mouth daily.       lisinopril (PRINIVIL,ZESTRIL) 20 MG tablet TAKE ONE TABLET BY MOUTH EVERY DAY 90 tablet 3     MULTIVITAMIN (MULTIPLE VITAMINS ORAL) Take by mouth.       pravastatin (PRAVACHOL) 40 MG tablet TAKE ONE TABLET BY MOUTH AT BEDTIME 90 tablet 3     triamcinolone (NASACORT AQ) 55 mcg nasal inhaler 2 sprays into each nostril daily.       No current facility-administered medications for this visit.      Past Medical History:   Diagnosis Date     Hypertension      Past Surgical History:   Procedure Laterality Date     [place] BIOPSY/EXCISION, LYMPH NODE(S)      Description: Biopsy Lymph Node;  Recorded: 09/02/2014;     Atorvastatin and Crestor [rosuvastatin]  Family History   Problem Relation Age of Onset     Heart disease Mother      Dementia Mother      Hyperlipidemia Father      Hypertension Father      No Medical Problems Sister      No Medical Problems Brother      No Medical Problems Daughter      No Medical Problems Son      Heart disease Brother      Pacemaker     Diabetes Brother      Social History     Social History     Marital status:      Spouse name: N/A     Number of children: N/A     Years of education: N/A     Occupational History     Not on file.     Social History Main Topics     Smoking status: Never Smoker     Smokeless tobacco: Never Used     Alcohol use Yes      Comment: Two beers per month.  Sometimes goes a few months without.     Drug use: No     Sexual activity: Yes     Partners: Female     Other Topics Concern     Not on file     Social History Narrative       Review of Systems  General:  Denies problem  Eyes: vision changes.   Had a recent eye exam  Ears/Nose/Throat: Denies problem  Cardiovascular: Denies problem  Respiratory:  Denies problem  Gastrointestinal:  Denies  "problem  Genitourinary: Denies problem  Musculoskeletal:  Denies problem  Skin: Denies problem  Neurologic: Denies problem  Psychiatric: Denies problem  Endocrine: Denies problem  Heme/Lymphatic: Denies problem   Allergic/Immunologic: Denies problem        Objective:     Vitals:    11/02/17 0928   BP: 110/80   Pulse: 60   Resp: 16   Weight: 175 lb 1.6 oz (79.4 kg)   Height: 5' 9.75\" (1.772 m)     Body mass index is 25.3 kg/(m^2).    Physical  General Appearance: Alert, cooperative, no distress, appears stated age  Head: Normocephalic, without obvious abnormality, atraumatic  Eyes: PERRL, conjunctiva/corneas clear, EOM's intact  Ears: Normal TM's and external ear canals, both ears  Nose: Nares normal, septum midline,mucosa normal, no drainage  Throat: Lips, mucosa, and tongue normal; teeth and gums normal  Neck: Supple, symmetrical, trachea midline, no adenopathy;  thyroid: not enlarged, symmetric, no tenderness/mass/nodules; no carotid bruit or JVD  Back: Symmetric, no curvature, ROM normal, no CVA tenderness  Lungs: Clear to auscultation bilaterally, respirations unlabored  Heart: Regular rate and rhythm, S1 and S2 normal, no murmur, rub, or gallop,  Abdomen: Soft, non-tender, bowel sounds active all four quadrants,  no masses, no organomegaly  Genitourinary: Penis normal. Right testis is descended. Left testis is descended.   PROSTATE SMOOTH SYMMETRIC WITHOUT NODULARITY, TX, OR FIRMNESS  Musculoskeletal: Normal range of motion. No joint swelling or deformity.   Extremities: Extremities normal, atraumatic, no cyanosis or edema  Skin: Skin color, texture, turgor normal, no rashes or lesions  Lymph nodes: Cervical, supraclavicular, and axillary nodes normal  Neurologic: He is alert. He has normal reflexes.   Psychiatric: He has a normal mood and affect.        ACT:  25  "

## 2021-06-14 NOTE — TELEPHONE ENCOUNTER
RN cannot approve Refill Request    RN can NOT refill this medication Protocol failed and NO refill given. Last office visit: 1/2/2020 Randy Moreno MD Last Physical: 12/5/2019 Last MTM visit: Visit date not found Last visit same specialty: 1/2/2020 Randy Moreno MD.  Next visit within 3 mo: Visit date not found  Next physical within 3 mo: Visit date not found      Justine Lockhart, [facility] Triage/Med Refill 1/14/2021    Requested Prescriptions   Pending Prescriptions Disp Refills     pravastatin (PRAVACHOL) 40 MG tablet [Pharmacy Med Name: PRAVASTATIN TABS 40MG] 90 tablet 3     Sig: TAKE 1 TABLET DAILY AT     BEDTIME       Statins Refill Protocol (Hmg CoA Reductase Inhibitors) Failed - 1/14/2021 12:09 AM        Failed - PCP or prescribing provider visit in past 12 months      Last office visit with prescriber/PCP: 1/2/2020 Randy Moreno MD OR same dept: Visit date not found OR same specialty: 1/2/2020 Randy Moreno MD  Last physical: 12/5/2019 Last MTM visit: Visit date not found   Next visit within 3 mo: Visit date not found  Next physical within 3 mo: Visit date not found  Prescriber OR PCP: Randy Moreno MD  Last diagnosis associated with med order: 1. Hyperlipidemia  - pravastatin (PRAVACHOL) 40 MG tablet [Pharmacy Med Name: PRAVASTATIN TABS 40MG]; TAKE 1 TABLET DAILY AT     BEDTIME  Dispense: 90 tablet; Refill: 3    If protocol passes may refill for 12 months if within 3 months of last provider visit (or a total of 15 months).

## 2021-06-14 NOTE — TELEPHONE ENCOUNTER
Refill Approved    Rx renewed per Medication Renewal Policy. Medication was last renewed on 12/5/19.    Justine Lockhart, [facility] Triage/Med Refill 12/29/2020     Requested Prescriptions   Pending Prescriptions Disp Refills     hydroCHLOROthiazide (HYDRODIURIL) 25 MG tablet [Pharmacy Med Name: HYDROCHLOROTHIAZIDE TABS 25MG] 90 tablet 3     Sig: TAKE 1 TABLET DAILY       Diuretics/Combination Diuretics Refill Protocol  Passed - 12/28/2020  7:54 AM        Passed - Visit with PCP or prescribing provider visit in past 12 months     Last office visit with prescriber/PCP: 1/2/2020 Randy Moreno MD OR same dept: 1/2/2020 Randy Moreno MD OR same specialty: 1/2/2020 Randy Moreno MD  Last physical: 12/5/2019 Last MTM visit: Visit date not found   Next visit within 3 mo: Visit date not found  Next physical within 3 mo: Visit date not found  Prescriber OR PCP: Randy Moreno MD  Last diagnosis associated with med order: 1. Hypertension  - hydroCHLOROthiazide (HYDRODIURIL) 25 MG tablet [Pharmacy Med Name: HYDROCHLOROTHIAZIDE TABS 25MG]; TAKE 1 TABLET DAILY  Dispense: 90 tablet; Refill: 3    If protocol passes may refill for 12 months if within 3 months of last provider visit (or a total of 15 months).             Passed - Serum Potassium in past 12 months      Lab Results   Component Value Date    Potassium 4.2 01/02/2020             Passed - Serum Sodium in past 12 months      Lab Results   Component Value Date    Sodium 141 01/02/2020             Passed - Blood pressure on file in past 12 months     BP Readings from Last 1 Encounters:   01/02/20 134/81             Passed - Serum Creatinine in past 12 months      Creatinine   Date Value Ref Range Status   01/02/2020 1.06 0.70 - 1.30 mg/dL Final

## 2021-06-14 NOTE — TELEPHONE ENCOUNTER
RN cannot approve Refill Request    RN can NOT refill this medication PCP messaged that patient is overdue for Labs. Last office visit: 1/2/2020 Randy Moreno MD Last Physical: 12/5/2019 Last MTM visit: Visit date not found Last visit same specialty: 1/2/2020 Randy Moreno MD.  Next visit within 3 mo: Visit date not found  Next physical within 3 mo: Visit date not found      Ann Toledo, [facility] Triage/Med Refill 1/17/2021    Requested Prescriptions   Pending Prescriptions Disp Refills     lisinopriL (PRINIVIL,ZESTRIL) 20 MG tablet [Pharmacy Med Name: LISINOPRIL TABS 20MG] 90 tablet 3     Sig: TAKE 1 TABLET DAILY       Ace Inhibitors Refill Protocol Failed - 1/17/2021 11:47 AM        Failed - PCP or prescribing provider visit in past 12 months       Last office visit with prescriber/PCP: 1/2/2020 Randy Moreno MD OR same dept: Visit date not found OR same specialty: 1/2/2020 Randy Moreno MD  Last physical: 12/5/2019 Last MTM visit: Visit date not found   Next visit within 3 mo: Visit date not found  Next physical within 3 mo: Visit date not found  Prescriber OR PCP: Randy Moreno MD  Last diagnosis associated with med order: 1. Hypertension  - lisinopriL (PRINIVIL,ZESTRIL) 20 MG tablet [Pharmacy Med Name: LISINOPRIL TABS 20MG]; TAKE 1 TABLET DAILY  Dispense: 90 tablet; Refill: 3    If protocol passes may refill for 12 months if within 3 months of last provider visit (or a total of 15 months).             Failed - Serum Potassium in past 12 months     No results found for: LN-POTASSIUM          Failed - Blood pressure filed in past 12 months     BP Readings from Last 1 Encounters:   01/02/20 134/81             Failed - Serum Creatinine in past 12 months     Creatinine   Date Value Ref Range Status   01/02/2020 1.06 0.70 - 1.30 mg/dL Final

## 2021-06-16 NOTE — TELEPHONE ENCOUNTER
RN cannot approve Refill Request    RN can NOT refill this medication PCP messaged that patient is overdue for Office Visit. Last office visit: 1/2/2020 Randy Moreno MD Last Physical: 12/5/2019 Last MTM visit: Visit date not found Last visit same specialty: 1/2/2020 Randy Moreno MD.  Next visit within 3 mo: Visit date not found  Next physical within 3 mo: Visit date not found      Ann Toledo, [facility] Triage/Med Refill 3/26/2021    Requested Prescriptions   Pending Prescriptions Disp Refills     hydroCHLOROthiazide (HYDRODIURIL) 25 MG tablet [Pharmacy Med Name: HYDROCHLOROTHIAZIDE TABS 25MG] 90 tablet 3     Sig: TAKE 1 TABLET DAILY       Diuretics/Combination Diuretics Refill Protocol  Failed - 3/26/2021  8:04 AM        Failed - Visit with PCP or prescribing provider visit in past 12 months     Last office visit with prescriber/PCP: 1/2/2020 Randy Moreno MD OR same dept: Visit date not found OR same specialty: 1/2/2020 Randy Moreno MD  Last physical: 12/5/2019 Last MTM visit: Visit date not found   Next visit within 3 mo: Visit date not found  Next physical within 3 mo: Visit date not found  Prescriber OR PCP: Randy Moreno MD  Last diagnosis associated with med order: 1. Hypertension  - hydroCHLOROthiazide (HYDRODIURIL) 25 MG tablet [Pharmacy Med Name: HYDROCHLOROTHIAZIDE TABS 25MG]; TAKE 1 TABLET DAILY  Dispense: 90 tablet; Refill: 3    If protocol passes may refill for 12 months if within 3 months of last provider visit (or a total of 15 months).             Failed - Serum Potassium in past 12 months      No results found for: LN-POTASSIUM          Failed - Serum Sodium in past 12 months      No results found for: LN-SODIUM          Failed - Serum Creatinine in past 12 months      Creatinine   Date Value Ref Range Status   01/02/2020 1.06 0.70 - 1.30 mg/dL Final             Passed - Blood pressure on file in past 12 months     BP Readings from Last  1 Encounters:   03/25/21 136/75

## 2021-06-16 NOTE — TELEPHONE ENCOUNTER
----- Message from Randy Moreno MD sent at 4/15/2021  8:56 AM CDT -----  Please call pt to re-itereate the need to see Urology ([phone]) due to a rising PSA.  I have entered a referral for him to see Dr Ewing or Dr Ramírez.

## 2021-06-16 NOTE — PATIENT INSTRUCTIONS - HE
Switch from advil to tylenol    Fasting labs    Cologuard    rxs sent in.    Nurse BP check in 1-2 weeks.

## 2021-06-16 NOTE — PROGRESS NOTES
MALE PREVENTATIVE EXAM    Assessment and Plan:       1. Health care maintenance      2. Hypertension  Mild elevation  - lisinopriL (PRINIVIL,ZESTRIL) 20 MG tablet; TAKE 1 TABLET DAILY  Dispense: 90 tablet; Refill: 3  - hydroCHLOROthiazide (HYDRODIURIL) 25 MG tablet; TAKE 1 TABLET DAILY  Dispense: 90 tablet; Refill: 3    3. Hyperlipidemia  STABLE ON STATIN  - pravastatin (PRAVACHOL) 40 MG tablet; Take 1 tablet (40 mg total) by mouth at bedtime.  Dispense: 90 tablet; Refill: 3  - Comprehensive Metabolic Panel  - Lipid [place] FASTING    4. Screen for colon cancer    - Cologuard    5. Screening PSA (prostate specific antigen)    - PSA, Annual Screen (Prostatic-Specific Antigen)    PLAN:   Switch from advil to tylenol    Fasting labs    Cologuard    rxs sent in.       Next follow up:  No follow-ups on file.    Immunization Review  Adult Imm Review: No immunizations due today  no tobacco; little alcohol    I discussed the following with the patient:   Adult Healthy Living: Importance of regular exercise  Healthy nutrition    I have had an Advance Directives discussion with the patient.   Pt has a living will    Subjective:   Chief Complaint: Antony Ferguson is an 65 y.o. male here for a preventative health visit.     HPI:  At home BPs like 136/70's.  On advil 800 mg twice daily since his fall from a ladder about 10 days ago.     Healthy Habits  Are you taking a daily aspirin? Yes  Do you typically exercising at least 40 min, 3-4 times per week?  Yes  Do you usually eat at least 4 servings of fruit and vegetables a day, include whole grains and fiber and avoid regularly eating high fat foods? Yes  Have you had an eye exam in the past two years? Yes  Do you see a dentist twice per year? Yes  Do you have any concerns regarding sleep? YES    Safety Screen  If you own firearms, are they secured in a locked gun cabinet or with trigger locks? NO  Do you feel you are safe where you are living?: Yes (4/5/2021  7:57 AM)  Do you  "feel you are safe in your relationship(s)?: Yes (4/5/2021  7:57 AM)      Review of Systems:   occas headaches, may once a montha nd not severe  Ringing in both ears   Please see above.  The rest of the review of systems are negative for all systems.     Cancer Screening     Patient has no health maintenance due at this time          Patient Care Team:  Randy Moreno MD as PCP - General (Family Medicine)  Randy Moreno MD as Assigned PCP        History     Reviewed By Date/Time Sections Reviewed    Viraj Quinn LPN 4/5/2021  7:58 AM Tobacco    Viraj Quinn LPN 4/5/2021  7:55 AM Tobacco    Viraj Quinn LPN 4/5/2021  7:53 AM Tobacco            Objective:   Vital Signs:   Visit Vitals  /88   Pulse 67   Temp 97.6  F (36.4  C) (Oral)   Resp 16   Ht 5' 9.25\" (1.759 m)   Wt 174 lb 8 oz (79.2 kg)   BMI 25.58 kg/m           PHYSICAL EXAM  /88   Pulse 67   Temp 97.6  F (36.4  C) (Oral)   Resp 16   Ht 5' 9.25\" (1.759 m)   Wt 174 lb 8 oz (79.2 kg)   BMI 25.58 kg/m      General Appearance:    Alert, cooperative, no distress, appears stated age   Head:    Normocephalic, without obvious abnormality, atraumatic   Eyes:    PERRL, conjunctiva/corneas clear, EOM's intact, fundi     benign, both eyes        Ears:    Normal TM's and external ear canals, both ears   Nose:   Nares normal, septum midline, mucosa normal, no drainage    or sinus tenderness   Throat:   Lips, mucosa, and tongue normal; teeth and gums normal   Neck:   Supple, symmetrical, trachea midline, no adenopathy;        thyroid:  No enlargement/tenderness/nodules; no carotid    bruit or JVD   Back:     Symmetric, no curvature, ROM normal, no CVA tenderness   Lungs:     Clear to auscultation bilaterally, respirations unlabored   Chest wall:    No tenderness or deformity   Heart:    Regular rate and rhythm, S1 and S2 normal, no murmur, rub    or gallop   Abdomen:     Soft, non-tender, bowel sounds active all four " quadrants,     no masses, no organomegaly   Genitalia:    Testicles without nodularity.   No inguinal hernias on exam.   Rectal:    Pt declines exam after discussion and will do PSA testing.    Extremities:   Extremities normal, atraumatic, no cyanosis or edema       Skin:   Skin color, texture, turgor normal, no rashes or lesions   Lymph nodes:   Cervical, supraclavicular, and axillary nodes normal         Lab Results   Component Value Date    CHOL 228 (H) 12/05/2019    CHOL 232 (H) 11/06/2018    CHOL 244 (H) 11/02/2017     Lab Results   Component Value Date    HDL 44 12/05/2019    HDL 45 11/06/2018    HDL 45 11/02/2017     Lab Results   Component Value Date    LDLCALC 159 (H) 12/05/2019    LDLCALC 166 (H) 11/06/2018    LDLCALC 172 (H) 11/02/2017     Lab Results   Component Value Date    TRIG 123 12/05/2019    TRIG 106 11/06/2018    TRIG 137 11/02/2017     Lab Results   Component Value Date    PSA 1.7 12/05/2019    PSA 1.5 11/06/2018    PSA 2.7 11/02/2017     Results for orders placed or performed in visit on 12/05/19   Comprehensive Metabolic Panel   Result Value Ref Range    Sodium 141 136 - 145 mmol/L    Potassium 4.7 3.5 - 5.0 mmol/L    Chloride 103 98 - 107 mmol/L    CO2 24 22 - 31 mmol/L    Anion Gap, Calculation 14 5 - 18 mmol/L    Glucose 90 70 - 125 mg/dL    BUN 22 8 - 22 mg/dL    Creatinine 0.94 0.70 - 1.30 mg/dL    GFR MDRD Af Amer >60 >60 mL/min/1.73m2    GFR MDRD Non Af Amer >60 >60 mL/min/1.73m2    Bilirubin, Total 1.0 0.0 - 1.0 mg/dL    Calcium 10.3 8.5 - 10.5 mg/dL    Protein, Total 7.6 6.0 - 8.0 g/dL    Albumin 4.5 3.5 - 5.0 g/dL    Alkaline Phosphatase 82 45 - 120 U/L    AST 31 0 - 40 U/L    ALT 37 0 - 45 U/L       No components found for: CHOLHDL          Additional Screenings Completed Today:

## 2021-06-16 NOTE — TELEPHONE ENCOUNTER
Left message to call back for: Results  Information to relay to patient:  LMTCB, Please see message below from .

## 2021-06-19 NOTE — LETTER
Letter by Randy Moreno MD at      Author: Randy Moreno MD Service: -- Author Type: --    Filed:  Encounter Date: 12/6/2019 Status: Signed         Antony Bauman [facility] [address]             December 6, 2019         Dear Mr. Ferguson,    Below are the results from your recent visit:    Resulted Orders   Comprehensive Metabolic Panel   Result Value Ref Range    Sodium 141 136 - 145 mmol/L    Potassium 4.7 3.5 - 5.0 mmol/L    Chloride 103 98 - 107 mmol/L    CO2 24 22 - 31 mmol/L    Anion Gap, Calculation 14 5 - 18 mmol/L    Glucose 90 70 - 125 mg/dL    BUN 22 8 - 22 mg/dL    Creatinine 0.94 0.70 - 1.30 mg/dL    GFR MDRD Af Amer >60 >60 mL/min/1.73m2    GFR MDRD Non Af Amer >60 >60 mL/min/1.73m2    Bilirubin, Total 1.0 0.0 - 1.0 mg/dL    Calcium 10.3 8.5 - 10.5 mg/dL    Protein, Total 7.6 6.0 - 8.0 g/dL    Albumin 4.5 3.5 - 5.0 g/dL    Alkaline Phosphatase 82 45 - 120 U/L    AST 31 0 - 40 U/L    ALT 37 0 - 45 U/L    Narrative    Fasting Glucose reference range is 70-99 mg/dL per  American Diabetes Association (ADA) guidelines.   Lipid [place] FASTING   Result Value Ref Range    Cholesterol 228 (H) <=199 mg/dL    Triglycerides 123 <=149 mg/dL    HDL Cholesterol 44 >=40 mg/dL    LDL Calculated 159 (H) <=129 mg/dL    Patient Fasting > 8hrs? Yes    PSA (Prostatic-Specific Antigen), Annual Screen   Result Value Ref Range    PSA 1.7 0.0 - 4.5 ng/mL    Narrative    Method is Abbott Prostate-Specific Antigen (PSA)  Standard-WHO 1st International (90:10)       Hi Pat:  Your PSA is normal with minimal change from last year (1.7 up from 1.5).  It should be rechecked in one year.    Your LDL cholesterol is mildly elevated.   Make sure you take that PRAVASTATIN daily.  I know its the only one you tolerate so we will keep you on it.    The remaining labs are normal.  It was a pleasure seeing you yesterday.    Please call with questions or contact us using  [employer].    Sincerely,        Electronically signed by Randy Moreno MD

## 2021-06-20 NOTE — LETTER
Letter by Randy Moreno MD at      Author: Randy Moreno MD Service: -- Author Type: --    Filed:  Encounter Date: 1/2/2020 Status: Signed         Antony Ferguson  1963 [facility] [address]             January 2, 2020         Dear Mr. Ferguson,    Below are the results from your recent visit:    Resulted Orders   Basic Metabolic Panel   Result Value Ref Range    Sodium 141 136 - 145 mmol/L    Potassium 4.2 3.5 - 5.0 mmol/L    Chloride 101 98 - 107 mmol/L    CO2 27 22 - 31 mmol/L    Anion Gap, Calculation 13 5 - 18 mmol/L    Glucose 137 (H) 70 - 125 mg/dL    Calcium 10.1 8.5 - 10.5 mg/dL    BUN 28 (H) 8 - 22 mg/dL    Creatinine 1.06 0.70 - 1.30 mg/dL    GFR MDRD Af Amer >60 >60 mL/min/1.73m2    GFR MDRD Non Af Amer >60 >60 mL/min/1.73m2    Narrative    Fasting Glucose reference range is 70-99 mg/dL per  American Diabetes Association (ADA) guidelines.       Hi Pat:  Your potassium and kidney function have remained normal.  The blood sugar is slightly elevated but is acceptable for a NON-FASTING state.  If you were fasting then we should look closer at the blood sugar.    Please call with questions or contact us using [employer].    Sincerely,        Electronically signed by Randy Moreno MD

## 2021-06-21 NOTE — LETTER
Letter by Randy Moreno MD at      Author: Randy Moreno MD Service: -- Author Type: --    Filed:  Encounter Date: 4/6/2021 Status: (Other)         Antony Ferguson  1963 [facility] [address]             April 6, 2021         Dear Mr. Ferguson,    Below are the results from your recent visit:    Resulted Orders   Comprehensive Metabolic Panel   Result Value Ref Range    Sodium 139 136 - 145 mmol/L    Potassium 4.2 3.5 - 5.0 mmol/L    Chloride 103 98 - 107 mmol/L    CO2 26 22 - 31 mmol/L    Anion Gap, Calculation 10 5 - 18 mmol/L    Glucose 94 70 - 125 mg/dL    BUN 26 (H) 8 - 22 mg/dL    Creatinine 0.95 0.70 - 1.30 mg/dL    GFR MDRD Af Amer >60 >60 mL/min/1.73m2    GFR MDRD Non Af Amer >60 >60 mL/min/1.73m2    Bilirubin, Total 0.8 0.0 - 1.0 mg/dL    Calcium 9.5 8.5 - 10.5 mg/dL    Protein, Total 7.2 6.0 - 8.0 g/dL    Albumin 4.4 3.5 - 5.0 g/dL    Alkaline Phosphatase 97 45 - 120 U/L    AST 26 0 - 40 U/L    ALT 38 0 - 45 U/L    Narrative    Fasting Glucose reference range is 70-99 mg/dL per  American Diabetes Association (ADA) guidelines.   Lipid [place] FASTING   Result Value Ref Range    Cholesterol 192 <=199 mg/dL    Triglycerides 127 <=149 mg/dL    HDL Cholesterol 41 >=40 mg/dL    LDL Calculated 126 <=129 mg/dL    Patient Fasting > 8hrs? Yes    PSA, Annual Screen (Prostatic-Specific Antigen)   Result Value Ref Range    PSA 2.7 0.0 - 4.5 ng/mL    Narrative    Method is Abbott Prostate-Specific Antigen (PSA)  Standard-WHO 1st International (90:10)           Please call with questions or contact us using [employer].    Sincerely,        Electronically signed by Randy Moreno MD

## 2021-06-21 NOTE — PROGRESS NOTES
Assessment:    History of persistent asthma that has been asymptomatic while on daily controller medication.  Spirometry within normal limits.  Nitric oxide within normal limits.  Similar results to testing done by previous allergist in 2014.    History of allergic rhinitis that is seasonal.    Plan:    Recommend trialing off the daily Flovent.  Albuterol should be available 2 puffs every 4 hours as needed.  If needing albuterol more than once a week, recommend restarting Flovent.  Follow-up annually if doing well.  Sooner if asthma is under poor control.  Plan a repeat spirometry and nitric oxide at that time.  ____________________________________________________________________________     Patient comes in today for follow-up of allergies and asthma.  He recalls onset of allergies in 1980.  At that time he was most significantly grass pollen.  He received allergy shots for 5-7 years at that time.  He seemed to do much better after allergy shots.  He recalls doing well until around 2005.  At that time got sick with an upper respiratory infection.  He recalls significant shortness of breath and wheezing.  He had a formal evaluation at that time and was felt that he had asthma.  Since then he has been using Flovent 44-2 puffs twice a day.  He reports that this is manage his symptoms well.  He does not have any breakthrough symptoms of asthma over the past year.  He is not used albuterol.  He does still does get occasional allergy symptoms in the summer for which he uses Allegra.  He describes these as mild and easily managed.  He often works out and exercises.  He does not note symptoms of wheezing or shortness of breath with this.  He has previously been followed by allergy and was seen as recently as 2016.  He did have spirometry done.  I was able to find a spirometry result from 2014.  He had FVC of 3.91 which is 82% predicted.  He had FEV1 of 3.06 L which is 84% of predicted.    Review of symptoms:  As above,  otherwise negative    Past medical history: Dyslipidemia, hypertension, diagnosis of sarcoidosis found on lymph node biopsy.  No note of pulmonary involvement.    Allergies: No known allergies to latex, and foods.  Atorvastatin and Crestor listed as drug allergy    Family history: No known member of the family with allergy or asthma.    Social history: Currently lives in house with forced air heat and central air conditioning.  There is a basement present.  There is a dog in the home.  No cigarette smoking history.    Medications: reviewed in chart    Physical Exam:  General:  Alert and in no apparent distress.  Eyes:  Sclera clear.  Ears: TMs translucent grey with bony landmarks visible. Nose: Pale, boggy mucosal membranes.  Throat: Pink, moist.  No lesions.  Neck: Supple.  No lymphadenopathy.  Lungs: CTA.  CV: Regular rate and rhythm. Extremities: Well perfused.  No clubbing or cyanosis. Skin: No rash    Spirometry: FEV1 to FVC ratio 74%.  FEV1 is 2.88 L which is 79% predicted.  FVC is 3.91 L which is 83% of predicted.  This is within normal limits.    Nitric oxide is 12 ppb    45 min spent in direct contact with the patient apart from testing.  More than 50% in counseling and coordination of care.

## 2021-06-21 NOTE — PROGRESS NOTES
Assessment:      Healthy male exam.    Encounter Diagnoses   Name Primary?     Mild persistent asthma without complication Yes     Benign Essential Hypertension      Dyslipidemia      Sarcoidosis - found on lymph node biopsy      Screening PSA (prostate specific antigen)      Health care maintenance          Plan:      Patient Instructions   Cologuard due in 2020    Fasting labs    Referral to see Allergy and Asthma (Dr Stevens)    Flu vaccine    Change lisinopril to morning and then recheck a Nurse BP IN ONE WEEK.       Subjective:      Antony Ferguson is a 63 y.o. male who presents for an annual exam. The patient reports that there is not domestic violence in his life.     BP at home this am 126/68    Healthy Habits:   Regular Exercise: Yes  Sunscreen Use: Yes  Healthy Diet: Yes  Dental Visits Regularly: Yes  Seat Belt: Yes  Sexually active: Yes  Monthly Self Testicular Exams:  Yes  Hemoccults: N/A  Flex Sig: N/A  Colonoscopy: Cologuard 12-22-17  Lipid Profile: Yes  Glucose Screen: Yes  Prevention of Osteoporosis: Yes  Last Dexa: N/A  Guns at Home:  No      Immunization History   Administered Date(s) Administered     DTaP, historic 06/18/2007     Hep A, Adult IM (19yr & older) 02/09/2016, 08/16/2016     Hep B, Adult 02/09/2016, 03/14/2016, 08/16/2016     Influenza, seasonal,quad inj 36+ mos 10/26/2015, 10/14/2016, 11/02/2017     Influenza, seasonal,quad inj 6-35 mos 11/02/2014     Pneumo Conj 13-V (2010&after) 10/26/2015     Pneumo Polysac 23-V 10/14/2016     Tdap 10/14/2016     Immunization status: up to date and documented.    No exam data present    Current Outpatient Prescriptions   Medication Sig Dispense Refill     aspirin 81 mg chewable tablet Chew 81 mg daily.       FLOVENT HFA 44 mcg/actuation inhaler Inhale 2 puffs 2 (two) times a day.        hydroCHLOROthiazide (HYDRODIURIL) 12.5 MG tablet TAKE 1 TABLET BY MOUTH     EVERY  DAY. 90 tablet 0     KRILL OIL ORAL Take 1,000 mg by mouth daily.       lisinopril  (PRINIVIL,ZESTRIL) 20 MG tablet TAKE 1 TABLET BY MOUTH     EVERY   DAY. 90 tablet 0     MULTIVITAMIN (MULTIPLE VITAMINS ORAL) Take by mouth.       pravastatin (PRAVACHOL) 40 MG tablet TAKE 1 TABLET DAILY AT     BEDTIME 90 tablet 0     triamcinolone (NASACORT AQ) 55 mcg nasal inhaler 2 sprays into each nostril daily.       No current facility-administered medications for this visit.      Past Medical History:   Diagnosis Date     Hypertension      Past Surgical History:   Procedure Laterality Date     GA BIOPSY/EXCISION, LYMPH NODE(S)      Description: Biopsy Lymph Node;  Recorded: 09/02/2014;     Atorvastatin and Crestor [rosuvastatin]  Family History   Problem Relation Age of Onset     Heart disease Mother      Dementia Mother      Hyperlipidemia Father      Hypertension Father      No Medical Problems Sister      No Medical Problems Brother      No Medical Problems Daughter      No Medical Problems Son      Heart disease Brother      Pacemaker     Diabetes Brother      Social History     Social History     Marital status:      Spouse name: N/A     Number of children: N/A     Years of education: N/A     Occupational History     Not on file.     Social History Main Topics     Smoking status: Never Smoker     Smokeless tobacco: Never Used     Alcohol use Yes      Comment: Two beers per month.  Sometimes goes a few months without.     Drug use: No     Sexual activity: Yes     Partners: Female     Other Topics Concern     Not on file     Social History Narrative       Review of Systems  General:  Denies problem  Eyes: Denies problem  Ears/Nose/Throat: chronic tinnitus  Cardiovascular: Denies problem  Respiratory:  Denies problem  Gastrointestinal:  Denies problem  Genitourinary: Denies problem  Musculoskeletal:  Denies problem  Skin: Denies problem  Neurologic: Denies problem  Psychiatric: Denies problem  Endocrine: Denies problem  Heme/Lymphatic: Denies problem   Allergic/Immunologic: Denies problem   "      Objective:     Vitals:    11/06/18 0951   BP: 142/76   Pulse: 63   Resp: 18   SpO2: 97%   Weight: 174 lb 3.2 oz (79 kg)   Height: 5' 9.5\" (1.765 m)     Body mass index is 25.36 kg/(m^2).    Physical  General Appearance: Alert, cooperative, no distress, appears stated age  Head: Normocephalic, without obvious abnormality, atraumatic  Eyes: PERRL, conjunctiva/corneas clear, EOM's intact  Ears: Normal TM's and external ear canals, both ears  Nose: Nares normal, septum midline,mucosa normal, no drainage  Throat: Lips, mucosa, and tongue normal; teeth and gums normal  Neck: Supple, symmetrical, trachea midline, no adenopathy;  thyroid: not enlarged, symmetric, no tenderness/mass/nodules; no carotid bruit or JVD  Back: Symmetric, no curvature, ROM normal, no CVA tenderness  Lungs: Clear to auscultation bilaterally, respirations unlabored  Heart: Regular rate and rhythm, S1 and S2 normal, no murmur, rub, or gallop,  Abdomen: Soft, non-tender, bowel sounds active all four quadrants,  no masses, no organomegaly  Genitourinary: Penis normal. Right testis is descended. Left testis is descended.   PROSTATE SMOOTH SYMMETRIC WITHOUT NODULARITY, TX, OR FIRMNESS  Musculoskeletal: Normal range of motion. No joint swelling or deformity.   Extremities: Extremities normal, atraumatic, no cyanosis or edema  Skin: Skin color, texture, turgor normal, no rashes or lesions  Lymph nodes: Cervical, supraclavicular, and axillary nodes normal  Neurologic: He is alert. He has normal reflexes.   Psychiatric: He has a normal mood and affect.      ACT:  20    Vitamin D, Total (25-Hydroxy)   Date Value Ref Range Status   11/02/2017 54.7 30.0 - 80.0 ng/mL Final          "

## 2021-06-21 NOTE — LETTER
Letter by Randy Moreno MD at      Author: Randy Moreno MD Service: -- Author Type: --    Filed:  Encounter Date: 4/6/2021 Status: (Other)         Antony Ferguson  1963 [facility] [address]             April 15, 2021         Dear Mr. Ferguson,    Below are the results from your recent visit:    Resulted Orders   Cologuard   Result Value Ref Range    COLOGUARD_EXT Negative     Narrative    COLOGUARD INTERNAL RESULT    Comprehensive Metabolic Panel   Result Value Ref Range    Sodium 139 136 - 145 mmol/L    Potassium 4.2 3.5 - 5.0 mmol/L    Chloride 103 98 - 107 mmol/L    CO2 26 22 - 31 mmol/L    Anion Gap, Calculation 10 5 - 18 mmol/L    Glucose 94 70 - 125 mg/dL    BUN 26 (H) 8 - 22 mg/dL    Creatinine 0.95 0.70 - 1.30 mg/dL    GFR MDRD Af Amer >60 >60 mL/min/1.73m2    GFR MDRD Non Af Amer >60 >60 mL/min/1.73m2    Bilirubin, Total 0.8 0.0 - 1.0 mg/dL    Calcium 9.5 8.5 - 10.5 mg/dL    Protein, Total 7.2 6.0 - 8.0 g/dL    Albumin 4.4 3.5 - 5.0 g/dL    Alkaline Phosphatase 97 45 - 120 U/L    AST 26 0 - 40 U/L    ALT 38 0 - 45 U/L    Narrative    Fasting Glucose reference range is 70-99 mg/dL per  American Diabetes Association (ADA) guidelines.   Lipid [place] FASTING   Result Value Ref Range    Cholesterol 192 <=199 mg/dL    Triglycerides 127 <=149 mg/dL    HDL Cholesterol 41 >=40 mg/dL    LDL Calculated 126 <=129 mg/dL    Patient Fasting > 8hrs? Yes    PSA, Annual Screen (Prostatic-Specific Antigen)   Result Value Ref Range    PSA 2.7 0.0 - 4.5 ng/mL    Narrative    Method is Abbott Prostate-Specific Antigen (PSA)  Standard-WHO 1st International (90:10)       Hi Pat:  Most notably your PSA has increased by a full point since your last PSA.  Because of this increase I would like you to follow up with Urology and I have placed a referral for you to see Dr Ramírez or Dr Ewing ([phone]).    Your Cologuard test is NEGATIVE and should be repeated in 3 years.  The cholesterol panel  looks good and the remaining labs are NORMAL.    Please call with questions or contact us using [employer].    Sincerely,        Electronically signed by Randy Moreno MD

## 2021-06-23 NOTE — TELEPHONE ENCOUNTER
Refill Approved    Rx renewed per Medication Renewal Policy. Medication was last renewed on 11/5/18 .    Jonathon Patten, [facility] Triage/Med Refill 2/9/2019     Requested Prescriptions   Pending Prescriptions Disp Refills     pravastatin (PRAVACHOL) 40 MG tablet [Pharmacy Med Name: PRAVASTATIN  TAB 40MG] 90 tablet 0     Sig: TAKE 1 TABLET DAILY AT     BEDTIME    Statins Refill Protocol (Hmg CoA Reductase Inhibitors) Passed - 2/7/2019  9:32 AM       Passed - PCP or prescribing provider visit in past 12 months     Last office visit with prescriber/PCP: 10/14/2016 Randy Moreno MD OR same dept: Visit date not found OR same specialty: 10/14/2016 Randy Moreno MD  Last physical: 11/6/2018 Last MTM visit: Visit date not found   Next visit within 3 mo: Visit date not found  Next physical within 3 mo: Visit date not found  Prescriber OR PCP: Randy Moreno MD  Last diagnosis associated with med order: 1. Hyperlipidemia  - pravastatin (PRAVACHOL) 40 MG tablet [Pharmacy Med Name: PRAVASTATIN  TAB 40MG]; TAKE 1 TABLET DAILY AT     BEDTIME  Dispense: 90 tablet; Refill: 0    If protocol passes may refill for 12 months if within 3 months of last provider visit (or a total of 15 months).

## 2021-06-24 NOTE — TELEPHONE ENCOUNTER
FYI - Status Update  Who is Calling: Patient  Update: Requesting a 7 day supply be submitted to [facility] in [place] to hold him over until his mail order arrives. Patient requesting this be addressed today. Patient will be going on vacation.  Okay to leave a detailed message?:  No return call needed

## 2021-06-24 NOTE — TELEPHONE ENCOUNTER
Refill Approved    Rx renewed per Medication Renewal Policy. Medication was last renewed on 11/5/18.    Neda Rodríguez, [facility] Triage/Med Refill 2/18/2019     Requested Prescriptions   Pending Prescriptions Disp Refills     lisinopril (PRINIVIL,ZESTRIL) 20 MG tablet [Pharmacy Med Name: LISINOPRIL TAB 20MG] 90 tablet 0     Sig: TAKE 1 TABLET BY MOUTH     EVERY   DAY.    Ace Inhibitors Refill Protocol Passed - 2/14/2019  9:40 AM       Passed - PCP or prescribing provider visit in past 12 months      Last office visit with prescriber/PCP: 10/14/2016 Randy Moreno MD OR same dept: Visit date not found OR same specialty: 10/14/2016 Randy Moreno MD  Last physical: 11/6/2018 Last MTM visit: Visit date not found   Next visit within 3 mo: Visit date not found  Next physical within 3 mo: Visit date not found  Prescriber OR PCP: Randy Moreno MD  Last diagnosis associated with med order: 1. Hypertension  - lisinopril (PRINIVIL,ZESTRIL) 20 MG tablet [Pharmacy Med Name: LISINOPRIL TAB 20MG]; TAKE 1 TABLET BY MOUTH     EVERY   DAY.  Dispense: 90 tablet; Refill: 0  - hydroCHLOROthiazide (HYDRODIURIL) 12.5 MG tablet [Pharmacy Med Name: HYDROCHLOROT TAB 12.5MG]; TAKE 1 TABLET BY MOUTH     EVERY  DAY.  Dispense: 90 tablet; Refill: 0    If protocol passes may refill for 12 months if within 3 months of last provider visit (or a total of 15 months).            Passed - Serum Potassium in past 12 months    Lab Results   Component Value Date    Potassium 4.9 11/06/2018            Passed - Blood pressure filed in past 12 months    BP Readings from Last 1 Encounters:   11/13/18 120/70            Passed - Serum Creatinine in past 12 months    Creatinine   Date Value Ref Range Status   11/06/2018 0.98 0.70 - 1.30 mg/dL Final             hydroCHLOROthiazide (HYDRODIURIL) 12.5 MG tablet [Pharmacy Med Name: HYDROCHLOROT TAB 12.5MG] 90 tablet 0     Sig: TAKE 1 TABLET BY MOUTH     EVERY  DAY.     Diuretics/Combination Diuretics Refill Protocol  Passed - 2/14/2019  9:40 AM       Passed - Visit with PCP or prescribing provider visit in past 12 months    Last office visit with prescriber/PCP: 10/14/2016 Randy Moreno MD OR same dept: Visit date not found OR same specialty: 10/14/2016 Randy Moreno MD  Last physical: 11/6/2018 Last MTM visit: Visit date not found   Next visit within 3 mo: Visit date not found  Next physical within 3 mo: Visit date not found  Prescriber OR PCP: Randy Moreno MD  Last diagnosis associated with med order: 1. Hypertension  - lisinopril (PRINIVIL,ZESTRIL) 20 MG tablet [Pharmacy Med Name: LISINOPRIL TAB 20MG]; TAKE 1 TABLET BY MOUTH     EVERY   DAY.  Dispense: 90 tablet; Refill: 0  - hydroCHLOROthiazide (HYDRODIURIL) 12.5 MG tablet [Pharmacy Med Name: HYDROCHLOROT TAB 12.5MG]; TAKE 1 TABLET BY MOUTH     EVERY  DAY.  Dispense: 90 tablet; Refill: 0    If protocol passes may refill for 12 months if within 3 months of last provider visit (or a total of 15 months).            Passed - Serum Potassium in past 12 months     Lab Results   Component Value Date    Potassium 4.9 11/06/2018            Passed - Serum Sodium in past 12 months     Lab Results   Component Value Date    Sodium 139 11/06/2018            Passed - Blood pressure on file in past 12 months    BP Readings from Last 1 Encounters:   11/13/18 120/70            Passed - Serum Creatinine in past 12 months     Creatinine   Date Value Ref Range Status   11/06/2018 0.98 0.70 - 1.30 mg/dL Final

## 2021-07-03 NOTE — ADDENDUM NOTE
Addendum Note by Nicanor Moreno MD at 11/2/2017 10:42 AM     Author: Nicanor Moreno MD Service: -- Author Type: Physician    Filed: 11/2/2017 10:42 AM Encounter Date: 11/2/2017 Status: Signed    : Nicanor Moreno MD (Physician)    Addended by: NICANOR MORENO on: 11/2/2017 10:42 AM        Modules accepted: Orders

## 2021-09-18 ENCOUNTER — HEALTH MAINTENANCE LETTER (OUTPATIENT)
Age: 66
End: 2021-09-18

## 2021-12-13 ENCOUNTER — OFFICE VISIT (OUTPATIENT)
Dept: FAMILY MEDICINE | Facility: CLINIC | Age: 66
End: 2021-12-13
Payer: COMMERCIAL

## 2021-12-13 VITALS
BODY MASS INDEX: 25.77 KG/M2 | TEMPERATURE: 98.2 F | HEART RATE: 75 BPM | OXYGEN SATURATION: 97 % | SYSTOLIC BLOOD PRESSURE: 140 MMHG | DIASTOLIC BLOOD PRESSURE: 80 MMHG | WEIGHT: 174 LBS | HEIGHT: 69 IN

## 2021-12-13 DIAGNOSIS — J67.9 HYPERSENSITIVITY PNEUMONITIS (H): Primary | ICD-10-CM

## 2021-12-13 DIAGNOSIS — R06.2 WHEEZING: ICD-10-CM

## 2021-12-13 PROCEDURE — 99214 OFFICE O/P EST MOD 30 MIN: CPT | Performed by: FAMILY MEDICINE

## 2021-12-13 RX ORDER — ALBUTEROL SULFATE 90 UG/1
2 AEROSOL, METERED RESPIRATORY (INHALATION) EVERY 6 HOURS
Qty: 18 G | Refills: 1 | Status: SHIPPED | OUTPATIENT
Start: 2021-12-13 | End: 2023-10-30

## 2021-12-13 RX ORDER — PREDNISONE 10 MG/1
TABLET ORAL
Qty: 18 TABLET | Refills: 0 | Status: SHIPPED | OUTPATIENT
Start: 2021-12-13 | End: 2021-12-22

## 2021-12-13 ASSESSMENT — ASTHMA QUESTIONNAIRES
QUESTION_4 LAST FOUR WEEKS HOW OFTEN HAVE YOU USED YOUR RESCUE INHALER OR NEBULIZER MEDICATION (SUCH AS ALBUTEROL): THREE OR MORE TIMES PER DAY
ACUTE_EXACERBATION_TODAY: NO
QUESTION_3 LAST FOUR WEEKS HOW OFTEN DID YOUR ASTHMA SYMPTOMS (WHEEZING, COUGHING, SHORTNESS OF BREATH, CHEST TIGHTNESS OR PAIN) WAKE YOU UP AT NIGHT OR EARLIER THAN USUAL IN THE MORNING: FOUR OR MORE NIGHTS A WEEK
QUESTION_1 LAST FOUR WEEKS HOW MUCH OF THE TIME DID YOUR ASTHMA KEEP YOU FROM GETTING AS MUCH DONE AT WORK, SCHOOL OR AT HOME: A LITTLE OF THE TIME
QUESTION_5 LAST FOUR WEEKS HOW WOULD YOU RATE YOUR ASTHMA CONTROL: POORLY CONTROLLED
QUESTION_2 LAST FOUR WEEKS HOW OFTEN HAVE YOU HAD SHORTNESS OF BREATH: THREE TO SIX TIMES A WEEK
ACT_TOTALSCORE: 11

## 2021-12-13 ASSESSMENT — MIFFLIN-ST. JEOR: SCORE: 1568.6

## 2021-12-14 ASSESSMENT — ASTHMA QUESTIONNAIRES: ACT_TOTALSCORE: 11

## 2021-12-14 NOTE — PROGRESS NOTES
"  Assessment & Plan     Hypersensitivity pneumonitis (H)    Wheezing    X-ray is fairly unremarkable.  It does show his sarcoidosis from before and when compared to previous x-rays it does not look any different to me.  We will put him on some prednisone tapering from 30 down to 10 over the next couple of weeks.  I gave him an albuterol inhaler as well.  I do not think he needs antibiotics at this time.  If is not getting better after this then we can resee him and determine what would be the next steps for him.  I also recommended that he follow-up with his primary care provider.      - XR Chest 2 Views; Future  - predniSONE (DELTASONE) 10 MG tablet; Take 3 tablets (30 mg) by mouth daily for 3 days, THEN 2 tablets (20 mg) daily for 3 days, THEN 1 tablet (10 mg) daily for 3 days.  - albuterol (PROAIR HFA/PROVENTIL HFA/VENTOLIN HFA) 108 (90 Base) MCG/ACT inhaler; Inhale 2 puffs into the lungs every 6 hours    Review of external notes as documented elsewhere in note  Review of the result(s) of each unique test - CXR  Ordering of each unique test  Prescription drug management         BMI:   Estimated body mass index is 25.51 kg/m  as calculated from the following:    Height as of this encounter: 1.759 m (5' 9.25\").    Weight as of this encounter: 78.9 kg (174 lb).           No follow-ups on file.    Oswald Quintero MD  [facility]    Catarina De La Cruz is a 66 year old who presents for the following health issues     HPI     Patient here today with some wheezing and coughing going on for now for almost 6 weeks.  He has been remodeling at his daughter's house and is a rather old house and when he is taken out ceiling tiles he is found mouse nests and been exposed to a lot of dust and mouse droppings etc.  He has been having some more issues with wheezing and has not had an inhaler.  He reports that he had problems with this a long time ago but has not had recent trouble with this.  He has not " "been coughing up much for sputum.  He has had no fevers or chills.  No loss of taste or smell.  He has lost a little bit of weight which was interesting to him but he is not sure what to make of this.  He has not been coughing up any blood.      Review of Systems   Constitutional, HEENT, cardiovascular, pulmonary, gi and gu systems are negative, except as otherwise noted.      Objective    BP (!) 140/80 (BP Location: Left arm, Patient Position: Sitting, Cuff Size: Adult Regular)   Pulse 75   Temp 98.2  F (36.8  C) (Oral)   Ht 1.759 m (5' 9.25\")   Wt 78.9 kg (174 lb)   SpO2 97%   BMI 25.51 kg/m    Body mass index is 25.51 kg/m .  Physical Exam   GENERAL: healthy, alert and no distress  NECK: no adenopathy, no asymmetry, masses, or scars and thyroid normal to palpation  RESP: lungs clear to auscultation - no rales, rhonchi or wheezes  CV: regular rate and rhythm, normal S1 S2, no S3 or S4, no murmur, click or rub, no peripheral edema and peripheral pulses strong  ABDOMEN: soft, nontender, no hepatosplenomegaly, no masses and bowel sounds normal  MS: no gross musculoskeletal defects noted, no edema    Xray - Reviewed and interpreted by me.  As above, evidence of sarcoidosis present but no other concerning acute findings.            "

## 2022-04-04 DIAGNOSIS — E78.5 HYPERLIPIDEMIA: ICD-10-CM

## 2022-04-05 RX ORDER — PRAVASTATIN SODIUM 40 MG
40 TABLET ORAL AT BEDTIME
Qty: 90 TABLET | Refills: 0 | Status: SHIPPED | OUTPATIENT
Start: 2022-04-05 | End: 2022-07-04

## 2022-04-05 NOTE — TELEPHONE ENCOUNTER
"Last Written Prescription Date:  4/5/21  Last Fill Quantity: 90,  # refills: 3   Last office visit provider:  12/13/21     Requested Prescriptions   Pending Prescriptions Disp Refills     pravastatin (PRAVACHOL) 40 MG tablet [Pharmacy Med Name: PRAVASTATIN TABS 40MG] 90 tablet 3     Sig: TAKE 1 TABLET AT BEDTIME       Statins Protocol Passed - 4/5/2022  9:08 AM        Passed - LDL on file in past 12 months     Recent Labs   Lab Test 04/05/21  0838                Passed - No abnormal creatine kinase in past 12 months     No lab results found.             Passed - Recent (12 mo) or future (30 days) visit within the authorizing provider's specialty     Patient has had an office visit with the authorizing provider or a provider within the authorizing providers department within the previous 12 mos or has a future within next 30 days. See \"Patient Info\" tab in inbasket, or \"Choose Columns\" in Meds & Orders section of the refill encounter.              Passed - Medication is active on med list        Passed - Patient is age 18 or older             Randy Chou RN 04/05/22 9:09 AM  "

## 2022-04-07 NOTE — TELEPHONE ENCOUNTER
First Attempt: I sent a my chart message to the patient to please contact our office to schedule a med check appt with fasting lab work.

## 2022-04-07 NOTE — TELEPHONE ENCOUNTER
Incoming fax from [facility] stating patient is allergic to Atorvastatin & Rosuvastatin.  Ok to fill Pravastatin 40 mg?  Drug clarification request form in Dr. Moreno's inbasket for review.

## 2022-06-06 DIAGNOSIS — I10 HYPERTENSION: ICD-10-CM

## 2022-06-07 RX ORDER — HYDROCHLOROTHIAZIDE 25 MG/1
TABLET ORAL
Qty: 90 TABLET | Refills: 3 | Status: SHIPPED | OUTPATIENT
Start: 2022-06-07 | End: 2023-06-02

## 2022-06-07 NOTE — TELEPHONE ENCOUNTER
Routing refill request to provider for review/approval because:  Labs not current:  Creatinine, potassium, sodium  Blood pressure under 140/90 not on file in past 12 months    BP Readings from Last 3 Encounters:   12/13/21 (!) 140/80   04/05/21 (!) 175/88   01/02/20 134/81     Creatinine   Date Value Ref Range Status   04/05/2021 0.95 0.70 - 1.30 mg/dL Final     Potassium   Date Value Ref Range Status   04/05/2021 4.2 3.5 - 5.0 mmol/L Final     Sodium   Date Value Ref Range Status   04/05/2021 139 136 - 145 mmol/L Final     Nilsa Howard RN

## 2022-06-10 ASSESSMENT — ENCOUNTER SYMPTOMS
WEAKNESS: 0
ABDOMINAL PAIN: 0
CONSTIPATION: 0
NERVOUS/ANXIOUS: 0
HEADACHES: 0
PALPITATIONS: 0
ARTHRALGIAS: 1
SHORTNESS OF BREATH: 0
CHILLS: 0
FEVER: 0
COUGH: 0
HEMATURIA: 0
JOINT SWELLING: 0
PARESTHESIAS: 0
DIARRHEA: 0
FREQUENCY: 0
HEARTBURN: 0
DIZZINESS: 0
MYALGIAS: 0
DYSURIA: 0
NAUSEA: 0
SORE THROAT: 0
HEMATOCHEZIA: 0
EYE PAIN: 0

## 2022-06-10 ASSESSMENT — ACTIVITIES OF DAILY LIVING (ADL): CURRENT_FUNCTION: NO ASSISTANCE NEEDED

## 2022-06-13 ENCOUNTER — OFFICE VISIT (OUTPATIENT)
Dept: FAMILY MEDICINE | Facility: CLINIC | Age: 67
End: 2022-06-13
Payer: COMMERCIAL

## 2022-06-13 VITALS
SYSTOLIC BLOOD PRESSURE: 153 MMHG | RESPIRATION RATE: 20 BRPM | BODY MASS INDEX: 25.92 KG/M2 | DIASTOLIC BLOOD PRESSURE: 76 MMHG | HEIGHT: 69 IN | HEART RATE: 69 BPM | WEIGHT: 175 LBS

## 2022-06-13 DIAGNOSIS — Z23 HIGH PRIORITY FOR 2019-NCOV VACCINE: ICD-10-CM

## 2022-06-13 DIAGNOSIS — I10 PRIMARY HYPERTENSION: ICD-10-CM

## 2022-06-13 DIAGNOSIS — Z00.00 HEALTH CARE MAINTENANCE: ICD-10-CM

## 2022-06-13 DIAGNOSIS — E78.5 HYPERLIPIDEMIA, UNSPECIFIED HYPERLIPIDEMIA TYPE: ICD-10-CM

## 2022-06-13 DIAGNOSIS — Z12.5 SCREENING PSA (PROSTATE SPECIFIC ANTIGEN): ICD-10-CM

## 2022-06-13 DIAGNOSIS — Z23 NEED FOR COVID-19 VACCINE: ICD-10-CM

## 2022-06-13 LAB
ALBUMIN SERPL-MCNC: 4.1 G/DL (ref 3.5–5)
ALP SERPL-CCNC: 74 U/L (ref 45–120)
ALT SERPL W P-5'-P-CCNC: 29 U/L (ref 0–45)
ANION GAP SERPL CALCULATED.3IONS-SCNC: 12 MMOL/L (ref 5–18)
AST SERPL W P-5'-P-CCNC: 28 U/L (ref 0–40)
BILIRUB SERPL-MCNC: 0.9 MG/DL (ref 0–1)
BUN SERPL-MCNC: 21 MG/DL (ref 8–22)
CALCIUM SERPL-MCNC: 10.2 MG/DL (ref 8.5–10.5)
CHLORIDE BLD-SCNC: 104 MMOL/L (ref 98–107)
CHOLEST SERPL-MCNC: 206 MG/DL
CO2 SERPL-SCNC: 26 MMOL/L (ref 22–31)
CREAT SERPL-MCNC: 0.95 MG/DL (ref 0.7–1.3)
ERYTHROCYTE [DISTWIDTH] IN BLOOD BY AUTOMATED COUNT: 12.8 % (ref 10–15)
FASTING STATUS PATIENT QL REPORTED: YES
GFR SERPL CREATININE-BSD FRML MDRD: 88 ML/MIN/1.73M2
GLUCOSE BLD-MCNC: 101 MG/DL (ref 70–125)
HCT VFR BLD AUTO: 44.7 % (ref 40–53)
HDLC SERPL-MCNC: 45 MG/DL
HGB BLD-MCNC: 14.9 G/DL (ref 13.3–17.7)
LDLC SERPL CALC-MCNC: 135 MG/DL
MCH RBC QN AUTO: 29.9 PG (ref 26.5–33)
MCHC RBC AUTO-ENTMCNC: 33.3 G/DL (ref 31.5–36.5)
MCV RBC AUTO: 90 FL (ref 78–100)
PLATELET # BLD AUTO: 281 10E3/UL (ref 150–450)
POTASSIUM BLD-SCNC: 4.7 MMOL/L (ref 3.5–5)
PROT SERPL-MCNC: 7.4 G/DL (ref 6–8)
PSA SERPL-MCNC: 1.38 UG/L (ref 0–4.5)
RBC # BLD AUTO: 4.98 10E6/UL (ref 4.4–5.9)
SODIUM SERPL-SCNC: 142 MMOL/L (ref 136–145)
TRIGL SERPL-MCNC: 130 MG/DL
WBC # BLD AUTO: 6.8 10E3/UL (ref 4–11)

## 2022-06-13 PROCEDURE — 85027 COMPLETE CBC AUTOMATED: CPT | Performed by: FAMILY MEDICINE

## 2022-06-13 PROCEDURE — 0054A COVID-19,PF,PFIZER (12+ YRS): CPT | Performed by: FAMILY MEDICINE

## 2022-06-13 PROCEDURE — 36415 COLL VENOUS BLD VENIPUNCTURE: CPT | Performed by: FAMILY MEDICINE

## 2022-06-13 PROCEDURE — 99397 PER PM REEVAL EST PAT 65+ YR: CPT | Mod: 25 | Performed by: FAMILY MEDICINE

## 2022-06-13 PROCEDURE — 80061 LIPID PANEL: CPT | Performed by: FAMILY MEDICINE

## 2022-06-13 PROCEDURE — 80053 COMPREHEN METABOLIC PANEL: CPT | Performed by: FAMILY MEDICINE

## 2022-06-13 PROCEDURE — 99213 OFFICE O/P EST LOW 20 MIN: CPT | Mod: 25 | Performed by: FAMILY MEDICINE

## 2022-06-13 PROCEDURE — G0103 PSA SCREENING: HCPCS | Performed by: FAMILY MEDICINE

## 2022-06-13 PROCEDURE — 91305 COVID-19,PF,PFIZER (12+ YRS): CPT | Performed by: FAMILY MEDICINE

## 2022-06-13 RX ORDER — FEXOFENADINE HCL 180 MG/1
180 TABLET ORAL PRN
COMMUNITY

## 2022-06-13 ASSESSMENT — ENCOUNTER SYMPTOMS
ARTHRALGIAS: 1
JOINT SWELLING: 0
DIZZINESS: 0
PALPITATIONS: 0
DYSURIA: 0
EYE PAIN: 0
MYALGIAS: 0
COUGH: 0
DIARRHEA: 0
SORE THROAT: 0
HEMATOCHEZIA: 0
CONSTIPATION: 0
CHILLS: 0
HEMATURIA: 0
WEAKNESS: 0
NERVOUS/ANXIOUS: 0
FREQUENCY: 0
HEADACHES: 0
NAUSEA: 0
PARESTHESIAS: 0
FEVER: 0
HEARTBURN: 0
SHORTNESS OF BREATH: 0
ABDOMINAL PAIN: 0

## 2022-06-13 ASSESSMENT — ACTIVITIES OF DAILY LIVING (ADL): CURRENT_FUNCTION: NO ASSISTANCE NEEDED

## 2022-06-13 NOTE — LETTER
June 13, 2022      Dorothy Ferguson  1963 [facility] [address]        Dear ,  We are writing to inform you of your test results.    Hi Pat:  Your PSA is normal and is down from last year.  Please recheck the PSA at your physical in one year.  The cholesterol panel is very near goal and the remaining labs are NORMAL.  It waws good seeing you in clinic today.    Resulted Orders   Comprehensive metabolic panel   Result Value Ref Range    Sodium 142 136 - 145 mmol/L    Potassium 4.7 3.5 - 5.0 mmol/L    Chloride 104 98 - 107 mmol/L    Carbon Dioxide (CO2) 26 22 - 31 mmol/L    Anion Gap 12 5 - 18 mmol/L    Urea Nitrogen 21 8 - 22 mg/dL    Creatinine 0.95 0.70 - 1.30 mg/dL    Calcium 10.2 8.5 - 10.5 mg/dL    Glucose 101 70 - 125 mg/dL    Alkaline Phosphatase 74 45 - 120 U/L    AST 28 0 - 40 U/L    ALT 29 0 - 45 U/L    Protein Total 7.4 6.0 - 8.0 g/dL    Albumin 4.1 3.5 - 5.0 g/dL    Bilirubin Total 0.9 0.0 - 1.0 mg/dL    GFR Estimate 88 >60 mL/min/1.73m2      Comment:      Effective December 21, 2021 eGFRcr in adults is calculated using the 2021 CKD-EPI creatinine equation which includes age and gender (Tarik et al., NE, DOI: 10.1056/ZXWGru0326075)   Lipid panel reflex to direct LDL Fasting   Result Value Ref Range    Cholesterol 206 (H) <=199 mg/dL    Triglycerides 130 <=149 mg/dL    Direct Measure HDL 45 >=40 mg/dL      Comment:      HDL Cholesterol Reference Range:     0-2 years:   No reference ranges established for patients under 2 years old  at [facility] for lipid analytes.    2-8 years:  Greater than 45 mg/dL     18 years and older:   Female: Greater than or equal to 50 mg/dL   Male:   Greater than or equal to 40 mg/dL    LDL Cholesterol Calculated 135 (H) <=129 mg/dL    Patient Fasting > 8hrs? Yes    Prostate Specific Antigen Screen   Result Value Ref Range    Prostate Specific Antigen Screen 1.38 0.00 - 4.50 ug/L    Narrative    Assay Method is Abbott Prostate-Specific Antigen  (PSA)  Standard-WHO 1st International (90:10)   CBC with platelets   Result Value Ref Range    WBC Count 6.8 4.0 - 11.0 10e3/uL    RBC Count 4.98 4.40 - 5.90 10e6/uL    Hemoglobin 14.9 13.3 - 17.7 g/dL    Hematocrit 44.7 40.0 - 53.0 %    MCV 90 78 - 100 fL    MCH 29.9 26.5 - 33.0 pg    MCHC 33.3 31.5 - 36.5 g/dL    RDW 12.8 10.0 - 15.0 %    Platelet Count 281 150 - 450 10e3/uL       If you have any questions or concerns, please call the clinic at the number listed above.       Sincerely,      Randy Moreno MD

## 2022-06-13 NOTE — PROGRESS NOTES
"SUBJECTIVE:   Antony Ferguson is a 67 year old male who presents for Preventive Visit.    HOME BPs in the 130's    Patient has been advised of split billing requirements and indicates understanding: Yes  Are you in the first 12 months of your Medicare coverage?  No    Healthy Habits:     In general, how would you rate your overall health?  Excellent    Frequency of exercise:  4-5 days/week    Duration of exercise:  15-30 minutes    Do you usually eat at least 4 servings of fruit and vegetables a day, include whole grains    & fiber and avoid regularly eating high fat or \"junk\" foods?  Yes    Taking medications regularly:  Yes    Medication side effects:  None    Ability to successfully perform activities of daily living:  No assistance needed    Home Safety:  No safety concerns identified    Hearing Impairment:  No hearing concerns    In the past 6 months, have you been bothered by leaking of urine?  No    In general, how would you rate your overall mental or emotional health?  Excellent      PHQ-2 Total Score: 0    Additional concerns today:  No    Do you feel safe in your environment? Yes    Have you ever done Advance Care Planning? (For example, a Health Directive, POLST, or a discussion with a medical provider or your loved ones about your wishes): Yes, patient states has an Advance Care Planning document and will bring a copy to the clinic.       Fall risk  Fallen 2 or more times in the past year?: No  Any fall with injury in the past year?: No    Cognitive Screening   1) Repeat 3 items (Leader, Season, Table)    2) Clock draw: NORMAL  3) 3 item recall: Recalls 3 objects  Results: 3 items recalled: COGNITIVE IMPAIRMENT LESS LIKELY    Mini-CogTM Copyright COLLEEN Florentino. Licensed by the author for use in [facility]; reprinted with permission (ruddy@.[facility]). All rights reserved.      Do you have sleep apnea, excessive snoring or daytime drowsiness?: no    Reviewed and updated as needed this visit by " "clinical staff   Tobacco  Allergies             NEVER SMOKER  ALCOHOL USE MINIMAL    Reviewed and updated as needed this visit by Provider                   Social History     Tobacco Use     Smoking status: Never Smoker     Smokeless tobacco: Never Used   Substance Use Topics     Alcohol use: Yes     Comment: Alcoholic Drinks/day: Two beers per month.  Sometimes goes a few months without.         Alcohol Use 6/10/2022   Prescreen: >3 drinks/day or >7 drinks/week? No       Current providers sharing in care for this patient include: Patient Care Team:  Randy Moreno MD as PCP - General  Randy Moreno MD as Assigned PCP    The following health maintenance items are reviewed in Epic and correct as of today:  Health Maintenance Due   Topic Date Due     HEPATITIS C SCREENING  Never done     Pneumococcal Vaccine: 65+ Years (2 - PPSV23 or PCV20) 10/14/2021     COVID-19 Vaccine (4 - Booster for Pfizer series) 03/15/2022       Review of Systems   Constitutional: Negative for chills and fever.   HENT: Negative for congestion, ear pain, hearing loss and sore throat.    Eyes: Negative for pain and visual disturbance.   Respiratory: Negative for cough and shortness of breath.    Cardiovascular: Negative for chest pain, palpitations and peripheral edema.   Gastrointestinal: Negative for abdominal pain, constipation, diarrhea, heartburn, hematochezia and nausea.   Genitourinary: Negative for dysuria, frequency, genital sores, hematuria, impotence, penile discharge and urgency.   Musculoskeletal: Positive for arthralgias. Negative for joint swelling and myalgias.   Skin: Negative for rash.   Neurological: Negative for dizziness, weakness, headaches and paresthesias.   Psychiatric/Behavioral: Negative for mood changes. The patient is not nervous/anxious.          OBJECTIVE:   BP (!) 153/76 (BP Location: Left arm, Patient Position: Sitting, Cuff Size: Adult Large)   Pulse 69   Resp 20   Ht 1.764 m (5' 9.45\")   Wt 79.4 " "kg (175 lb)   BMI 25.51 kg/m   Estimated body mass index is 25.51 kg/m  as calculated from the following:    Height as of this encounter: 1.764 m (5' 9.45\").    Weight as of this encounter: 79.4 kg (175 lb).  Physical Exam  GENERAL: healthy, alert and no distress  EYES: Eyes grossly normal to inspection, PERRL and conjunctivae and sclerae normal  HENT: ear canals and TM's normal, nose and mouth without ulcers or lesions  NECK: no adenopathy, no asymmetry, masses, or scars and thyroid normal to palpation  RESP: lungs clear to auscultation - no rales, rhonchi or wheezes  CV: regular rate and rhythm, normal S1 S2, no S3 or S4, no murmur, click or rub, no peripheral edema and peripheral pulses strong  ABDOMEN: soft, nontender, no hepatosplenomegaly, no masses and bowel sounds [id number]  RECTAL:  EXAM DECLINED.  DO YEARLY PSA  MS: no gross musculoskeletal defects noted, no edema  SKIN: no suspicious lesions or rashes  NEURO: Normal strength and tone, mentation intact and speech normal  PSYCH: mentation appears normal, affect normal/bright    Prostate Specific Antigen Screen   Date Value Ref Range Status   04/05/2021 2.7 0.0 - 4.5 ng/mL Final     Lab Results   Component Value Date    CHOL 192 04/05/2021    CHOL 228 12/05/2019     Lab Results   Component Value Date    HDL 41 04/05/2021    HDL 44 12/05/2019     Lab Results   Component Value Date     04/05/2021     12/05/2019     Lab Results   Component Value Date    TRIG 127 04/05/2021    TRIG 123 12/05/2019     No results found for: CHOLHDLRATIO    ASSESSMENT / PLAN:       ICD-10-CM    1. Health care maintenance  Z00.00 REVIEW OF HEALTH MAINTENANCE PROTOCOL ORDERS     CBC with platelets   2. High priority for 2019-nCoV vaccine  Z23    3. Primary hypertension, mildly elevated I10    4. Hyperlipidemia, unspecified hyperlipidemia type, stable on statin E78.5 Comprehensive metabolic panel     Lipid panel reflex to direct LDL Fasting   5. Screening PSA " "(prostate specific antigen)  Z12.5 Prostate Specific Antigen Screen   6. Need for COVID-19 vaccine  Z23 COVID-19,PF,PFIZER (12+ Yrs GRAY LABEL)       PLAN:        Colonoguard due in April 2024    Fasting labs    Covid booster    Nurse BP check in 2-4 weeks.     Patient has been advised of split billing requirements and indicates understanding: Yes    COUNSELING:  Reviewed preventive health counseling, as reflected in patient instructions       Regular exercise       Healthy diet/nutrition       Colon cancer screening       Prostate cancer screening    Estimated body mass index is 25.51 kg/m  as calculated from the following:    Height as of this encounter: 1.764 m (5' 9.45\").    Weight as of this encounter: 79.4 kg (175 lb).    Weight management plan: Discussed healthy diet and exercise guidelines    He reports that he has never smoked. He has never used smokeless tobacco.      Appropriate preventive services were discussed with this patient, including applicable screening as appropriate for cardiovascular disease, diabetes, osteopenia/osteoporosis, and glaucoma.  As appropriate for age/gender, discussed screening for colorectal cancer, prostate cancer, breast cancer, and cervical cancer. Checklist reviewing preventive services available has been given to the patient.    Reviewed patients plan of care and provided an AVS. The Basic Care Plan (routine screening as documented in Health Maintenance) for Antony meets the Care Plan requirement. This Care Plan has been established and reviewed with the Patient.          Randy Moreno MD  [employer]    Identified Health Risks:  "

## 2022-06-27 DIAGNOSIS — I10 HYPERTENSION: ICD-10-CM

## 2022-06-28 NOTE — TELEPHONE ENCOUNTER
"Routing refill request to provider for review/approval because:  bp out of range    Last Written Prescription Date:  4/1/22  Last Fill Quantity: 90,  # refills: 0   Last office visit provider:  6/13/22     Requested Prescriptions   Pending Prescriptions Disp Refills     lisinopril (ZESTRIL) 20 MG tablet [Pharmacy Med Name: LISINOPRIL TABS 20MG] 90 tablet 3     Sig: TAKE 1 TABLET DAILY       ACE Inhibitors (Including Combos) Protocol Failed - 6/27/2022 11:52 PM        Failed - Blood pressure under 140/90 in past 12 months     BP Readings from Last 3 Encounters:   06/13/22 (!) 153/76   12/13/21 (!) 140/80   04/05/21 (!) 175/88                 Passed - Recent (12 mo) or future (30 days) visit within the authorizing provider's specialty     Patient has had an office visit with the authorizing provider or a provider within the authorizing providers department within the previous 12 mos or has a future within next 30 days. See \"Patient Info\" tab in inbasket, or \"Choose Columns\" in Meds & Orders section of the refill encounter.              Passed - Medication is active on med list        Passed - Patient is age 18 or older        Passed - Normal serum creatinine on file in past 12 months     Recent Labs   Lab Test 06/13/22  1027   CR 0.95       Ok to refill medication if creatinine is low          Passed - Normal serum potassium on file in past 12 months     Recent Labs   Lab Test 06/13/22  1027   POTASSIUM 4.7                  Justine Lockhart RN 06/28/22 6:54 PM  "

## 2022-06-29 RX ORDER — LISINOPRIL 20 MG/1
TABLET ORAL
Qty: 90 TABLET | Refills: 3 | Status: SHIPPED | OUTPATIENT
Start: 2022-06-29 | End: 2023-06-26

## 2022-07-04 DIAGNOSIS — E78.5 HYPERLIPIDEMIA: ICD-10-CM

## 2022-07-04 RX ORDER — PRAVASTATIN SODIUM 40 MG
TABLET ORAL
Qty: 90 TABLET | Refills: 3 | Status: SHIPPED | OUTPATIENT
Start: 2022-07-04 | End: 2023-06-29

## 2022-07-04 NOTE — TELEPHONE ENCOUNTER
"Routing refill request to provider for review/approval because:  Drug allergy warning    Last Written Prescription Date:  4/5/22  Last Fill Quantity: 90,  # refills: 0   Last office visit provider:  6/13/22     Requested Prescriptions   Pending Prescriptions Disp Refills     pravastatin (PRAVACHOL) 40 MG tablet [Pharmacy Med Name: PRAVASTATIN TABS 40MG] 90 tablet 3     Sig: TAKE 1 TABLET AT BEDTIME       Statins Protocol Passed - 7/4/2022 12:10 AM        Passed - LDL on file in past 12 months     Recent Labs   Lab Test 06/13/22  1027   *             Passed - No abnormal creatine kinase in past 12 months     No lab results found.             Passed - Recent (12 mo) or future (30 days) visit within the authorizing provider's specialty     Patient has had an office visit with the authorizing provider or a provider within the authorizing providers department within the previous 12 mos or has a future within next 30 days. See \"Patient Info\" tab in inbasket, or \"Choose Columns\" in Meds & Orders section of the refill encounter.              Passed - Medication is active on med list        Passed - Patient is age 18 or older             Jocelyn Yang RN 07/04/22 1:02 PM  "

## 2022-11-19 ENCOUNTER — HEALTH MAINTENANCE LETTER (OUTPATIENT)
Age: 67
End: 2022-11-19

## 2023-05-09 ENCOUNTER — OFFICE VISIT (OUTPATIENT)
Dept: FAMILY MEDICINE | Facility: CLINIC | Age: 68
End: 2023-05-09
Payer: COMMERCIAL

## 2023-05-09 VITALS
SYSTOLIC BLOOD PRESSURE: 141 MMHG | RESPIRATION RATE: 16 BRPM | DIASTOLIC BLOOD PRESSURE: 77 MMHG | HEART RATE: 66 BPM | WEIGHT: 177 LBS | TEMPERATURE: 96.5 F | BODY MASS INDEX: 26.22 KG/M2 | OXYGEN SATURATION: 96 % | HEIGHT: 69 IN

## 2023-05-09 DIAGNOSIS — I10 ESSENTIAL HYPERTENSION, BENIGN: ICD-10-CM

## 2023-05-09 DIAGNOSIS — Z11.59 NEED FOR HEPATITIS C SCREENING TEST: ICD-10-CM

## 2023-05-09 DIAGNOSIS — H35.9 RETINA DISORDER, LEFT: ICD-10-CM

## 2023-05-09 DIAGNOSIS — R73.9 ELEVATED BLOOD SUGAR: Primary | ICD-10-CM

## 2023-05-09 DIAGNOSIS — Z01.818 PRE-OP EXAM: ICD-10-CM

## 2023-05-09 LAB
ALBUMIN SERPL BCG-MCNC: 4.6 G/DL (ref 3.5–5.2)
ALP SERPL-CCNC: 67 U/L (ref 40–129)
ALT SERPL W P-5'-P-CCNC: 34 U/L (ref 10–50)
ANION GAP SERPL CALCULATED.3IONS-SCNC: 11 MMOL/L (ref 7–15)
AST SERPL W P-5'-P-CCNC: 31 U/L (ref 10–50)
ATRIAL RATE - MUSE: 58 BPM
BILIRUB SERPL-MCNC: 0.6 MG/DL
BUN SERPL-MCNC: 24.2 MG/DL (ref 8–23)
CALCIUM SERPL-MCNC: 9.9 MG/DL (ref 8.8–10.2)
CHLORIDE SERPL-SCNC: 104 MMOL/L (ref 98–107)
CREAT SERPL-MCNC: 1.12 MG/DL (ref 0.67–1.17)
DEPRECATED HCO3 PLAS-SCNC: 27 MMOL/L (ref 22–29)
DIASTOLIC BLOOD PRESSURE - MUSE: NORMAL MMHG
ERYTHROCYTE [DISTWIDTH] IN BLOOD BY AUTOMATED COUNT: 12.3 % (ref 10–15)
GFR SERPL CREATININE-BSD FRML MDRD: 72 ML/MIN/1.73M2
GLUCOSE SERPL-MCNC: 120 MG/DL (ref 70–99)
HCT VFR BLD AUTO: 44.8 % (ref 40–53)
HCV AB SERPL QL IA: NONREACTIVE
HGB BLD-MCNC: 15.1 G/DL (ref 13.3–17.7)
INTERPRETATION ECG - MUSE: NORMAL
MCH RBC QN AUTO: 30 PG (ref 26.5–33)
MCHC RBC AUTO-ENTMCNC: 33.7 G/DL (ref 31.5–36.5)
MCV RBC AUTO: 89 FL (ref 78–100)
P AXIS - MUSE: 29 DEGREES
PLATELET # BLD AUTO: 273 10E3/UL (ref 150–450)
POTASSIUM SERPL-SCNC: 4.4 MMOL/L (ref 3.4–5.3)
PR INTERVAL - MUSE: 150 MS
PROT SERPL-MCNC: 7.4 G/DL (ref 6.4–8.3)
QRS DURATION - MUSE: 82 MS
QT - MUSE: 398 MS
QTC - MUSE: 390 MS
R AXIS - MUSE: 29 DEGREES
RBC # BLD AUTO: 5.04 10E6/UL (ref 4.4–5.9)
SODIUM SERPL-SCNC: 142 MMOL/L (ref 136–145)
SYSTOLIC BLOOD PRESSURE - MUSE: NORMAL MMHG
T AXIS - MUSE: 30 DEGREES
VENTRICULAR RATE- MUSE: 58 BPM
WBC # BLD AUTO: 7.4 10E3/UL (ref 4–11)

## 2023-05-09 PROCEDURE — 80053 COMPREHEN METABOLIC PANEL: CPT | Performed by: FAMILY MEDICINE

## 2023-05-09 PROCEDURE — 83036 HEMOGLOBIN GLYCOSYLATED A1C: CPT | Performed by: FAMILY MEDICINE

## 2023-05-09 PROCEDURE — 36415 COLL VENOUS BLD VENIPUNCTURE: CPT | Performed by: FAMILY MEDICINE

## 2023-05-09 PROCEDURE — 85027 COMPLETE CBC AUTOMATED: CPT | Performed by: FAMILY MEDICINE

## 2023-05-09 PROCEDURE — 99214 OFFICE O/P EST MOD 30 MIN: CPT | Performed by: FAMILY MEDICINE

## 2023-05-09 PROCEDURE — 93005 ELECTROCARDIOGRAM TRACING: CPT | Performed by: FAMILY MEDICINE

## 2023-05-09 PROCEDURE — 86803 HEPATITIS C AB TEST: CPT | Performed by: FAMILY MEDICINE

## 2023-05-09 PROCEDURE — 93010 ELECTROCARDIOGRAM REPORT: CPT | Performed by: INTERNAL MEDICINE

## 2023-05-09 NOTE — LETTER
May 10, 2023      Dorothy Ferguson  1963 [facility] [address]        Dear ,  We are writing to inform you of your test results.    Hi Pat:  Your pre-op labs look good.  The blood sugar was slightly up (normal if non-fasting) and I added an A1C to check your overall blood glucose control in the last 3 months.  The Hepatitis C screen was NEGATIVE.     Resulted Orders   Hepatitis C Screen Reflex to HCV RNA Quant and Genotype   Result Value Ref Range    Hepatitis C Antibody Nonreactive Nonreactive    Narrative    Assay performance characteristics have not been established for newborns, infants, and children.   CBC with platelets   Result Value Ref Range    WBC Count 7.4 4.0 - 11.0 10e3/uL    RBC Count 5.04 4.40 - 5.90 10e6/uL    Hemoglobin 15.1 13.3 - 17.7 g/dL    Hematocrit 44.8 40.0 - 53.0 %    MCV 89 78 - 100 fL    MCH 30.0 26.5 - 33.0 pg    MCHC 33.7 31.5 - 36.5 g/dL    RDW 12.3 10.0 - 15.0 %    Platelet Count 273 150 - 450 10e3/uL   Comprehensive metabolic panel (BMP + Alb, Alk Phos, ALT, AST, Total. Bili, TP)   Result Value Ref Range    Sodium 142 136 - 145 mmol/L    Potassium 4.4 3.4 - 5.3 mmol/L    Chloride 104 98 - 107 mmol/L    Carbon Dioxide (CO2) 27 22 - 29 mmol/L    Anion Gap 11 7 - 15 mmol/L    Urea Nitrogen 24.2 (H) 8.0 - 23.0 mg/dL    Creatinine 1.12 0.67 - 1.17 mg/dL    Calcium 9.9 8.8 - 10.2 mg/dL    Glucose 120 (H) 70 - 99 mg/dL    Alkaline Phosphatase 67 40 - 129 U/L    AST 31 10 - 50 U/L    ALT 34 10 - 50 U/L    Protein Total 7.4 6.4 - 8.3 g/dL    Albumin 4.6 3.5 - 5.2 g/dL    Bilirubin Total 0.6 <=1.2 mg/dL    GFR Estimate 72 >60 mL/min/1.73m2      Comment:      eGFR calculated using 2021 CKD-EPI equation.       If you have any questions or concerns, please call the clinic at the number listed above.       Sincerely,      Randy Moreno MD

## 2023-05-09 NOTE — LETTER
May 10, 2023      Dorothy GURWINDER Mccordt  1963 [facility] [address]        Dear ,  We are writing to inform you of your test results.    Hi Pat:  This is an addendum to your last lab letter.  The A1C shows you have pre-diabetes which borders on being diabetes.  Please follow up with me after your upcoming surgery to discuss treatment options.      Resulted Orders   Hepatitis C Screen Reflex to HCV RNA Quant and Genotype   Result Value Ref Range    Hepatitis C Antibody Nonreactive Nonreactive    Narrative    Assay performance characteristics have not been established for newborns, infants, and children.   CBC with platelets   Result Value Ref Range    WBC Count 7.4 4.0 - 11.0 10e3/uL    RBC Count 5.04 4.40 - 5.90 10e6/uL    Hemoglobin 15.1 13.3 - 17.7 g/dL    Hematocrit 44.8 40.0 - 53.0 %    MCV 89 78 - 100 fL    MCH 30.0 26.5 - 33.0 pg    MCHC 33.7 31.5 - 36.5 g/dL    RDW 12.3 10.0 - 15.0 %    Platelet Count 273 150 - 450 10e3/uL   Comprehensive metabolic panel (BMP + Alb, Alk Phos, ALT, AST, Total. Bili, TP)   Result Value Ref Range    Sodium 142 136 - 145 mmol/L    Potassium 4.4 3.4 - 5.3 mmol/L    Chloride 104 98 - 107 mmol/L    Carbon Dioxide (CO2) 27 22 - 29 mmol/L    Anion Gap 11 7 - 15 mmol/L    Urea Nitrogen 24.2 (H) 8.0 - 23.0 mg/dL    Creatinine 1.12 0.67 - 1.17 mg/dL    Calcium 9.9 8.8 - 10.2 mg/dL    Glucose 120 (H) 70 - 99 mg/dL    Alkaline Phosphatase 67 40 - 129 U/L    AST 31 10 - 50 U/L    ALT 34 10 - 50 U/L    Protein Total 7.4 6.4 - 8.3 g/dL    Albumin 4.6 3.5 - 5.2 g/dL    Bilirubin Total 0.6 <=1.2 mg/dL    GFR Estimate 72 >60 mL/min/1.73m2      Comment:      eGFR calculated using 2021 CKD-EPI equation.   Hemoglobin A1c   Result Value Ref Range    Hemoglobin A1C 6.4 (H) 0.0 - 5.6 %      Comment:      Normal <5.7%   Prediabetes 5.7-6.4%    Diabetes 6.5% or higher     Note: Adopted from ADA consensus guidelines.       If you have any questions or concerns, please call the clinic at the  number listed above.       Sincerely,      Randy Moreno MD

## 2023-05-09 NOTE — PROGRESS NOTES
[facility]  [address] [facility] [address]  Phone: [phone]  Fax: [phone]  Primary Provider: Nicanor Leigh  Pre-op Performing Provider: NICANOR LEIGH      PREOPERATIVE EVALUATION:  Today's date: 5/9/2023    Antony Ferguson is a 68 year old male who presents for a preoperative evaluation.      5/9/2023     7:57 AM   Additional Questions   Roomed by Ivet FORREST CMA     Forms 5/9/2023   Any forms needing to be completed Yes     Surgical Information:  Surgery/Procedure: Retina Surgery - Left Eye   Surgery Location: [facility]  Surgeon:  Dr Julio Cesar Gonzalez  Surgery Date:  5/16/2023  Time of Surgery:   Where patient plans to recover: At home with family  Fax number for surgical facility: [phone]    Assessment & Plan     The proposed surgical procedure is considered LOW risk.    Pre-op exam    - CBC with platelets; Future  - Comprehensive metabolic panel (BMP + Alb, Alk Phos, ALT, AST, Total. Bili, TP); Future  - EKG 12-lead, tracing only    Retina disorder, left    - CBC with platelets; Future  - Comprehensive metabolic panel (BMP + Alb, Alk Phos, ALT, AST, Total. Bili, TP); Future  - EKG 12-lead, tracing only    Benign Essential Hypertension  controlled    Need for hepatitis C screening test  Agreeable to screening  - Hepatitis C Screen Reflex to HCV RNA Quant and Genotype; Future            - No identified additional risk factors other than previously addressed    Antiplatelet or Anticoagulation Medication Instructions:   - aspirin: Discontinue aspirin 7-10 days prior to procedure to reduce bleeding risk. It should be resumed postoperatively.     Additional Medication Instructions:  Patient is to take all scheduled medications on the day of surgery    RECOMMENDATION:  APPROVAL GIVEN to proceed with proposed procedure, without further diagnostic evaluation.    956}    Subjective     HPI related to upcoming procedure: wavy vision in the left eye  onset about 3 weeks ago.  Seems to come and go.        5/6/2023    12:02 PM   Preop Questions   1. Have you ever had a heart attack or stroke? No   2. Have you ever had surgery on your heart or blood vessels, such as a stent placement, a coronary artery bypass, or surgery on an artery in your head, neck, heart, or legs? No   3. Do you have chest pain with activity? No   4. Do you have a history of  heart failure? No   5. Do you currently have a cold, bronchitis or symptoms of other infection? No   6. Do you have a cough, shortness of breath, or wheezing? No   7. Do you or anyone in your family have previous history of blood clots? No   8. Do you or does anyone in your family have a serious bleeding problem such as prolonged bleeding following surgeries or cuts? No   9. Have you ever had problems with anemia or been told to take iron pills? No   10. Have you had any abnormal blood loss such as black, tarry or bloody stools? No   11. Have you ever had a blood transfusion? No   12. Are you willing to have a blood transfusion if it is medically needed before, during, or after your surgery? Yes   13. Have you or any of your relatives ever had problems with anesthesia? Yes:  Mother had a hive reaction to some anesthetic   14. Do you have sleep apnea, excessive snoring or daytime drowsiness? No   15. Do you have any artifical heart valves or other implanted medical devices like a pacemaker, defibrillator, or continuous glucose monitor? No   16. Do you have artificial joints? No   17. Are you allergic to latex? No       Health Care Directive:  Patient does not have a Health Care Directive or Living Will: Patient states has Advance Directive and will bring in a copy to clinic.    Preoperative Review of :   reviewed - no record of controlled substances prescribed.        Review of Systems  CONSTITUTIONAL: NEGATIVE for fever, chills, change in weight  INTEGUMENTARY/SKIN: NEGATIVE for worrisome rashes, moles or  lesions  EYES: NEGATIVE for vision changes or irritation  ENT/MOUTH: NEGATIVE for ear, mouth and throat problems  RESP: NEGATIVE for significant cough or SOB  CV: NEGATIVE for chest pain, palpitations or peripheral edema  GI: NEGATIVE for nausea, abdominal pain, heartburn, or change in bowel habits  : NEGATIVE for frequency, dysuria, or hematuria  MUSCULOSKELETAL: NEGATIVE for significant arthralgias or myalgia  NEURO: NEGATIVE for weakness, dizziness or paresthesias  ENDOCRINE: NEGATIVE for temperature intolerance, skin/hair changes  HEME: NEGATIVE for bleeding problems  PSYCHIATRIC: NEGATIVE for changes in mood or affect     Ringing in the ears    Patient Active Problem List    Diagnosis Date Noted     Benign Essential Hypertension      Priority: Medium     Created by Conversion         Dyslipidemia      Priority: Medium     Created by Conversion         Webbed toes of both feet - harmless and likely to be passed on to your children 50% of the time. 10/26/2015     Priority: Medium     Sarcoidosis - found on lymph node biopsy 06/18/2015     Priority: Medium     Allergic Rhinitis Due To Pollen      Priority: Medium     Created by Conversion          Past Medical History:   Diagnosis Date     Hypertension      Past Surgical History:   Procedure Laterality Date     IN BIOPSY/EXCISION, LYMPH NODE(S)      Description: Biopsy Lymph Node;  Recorded: 09/02/2014;     Back surgery (2015  Cyst on nerve removed    Current Outpatient Medications   Medication Sig Dispense Refill     albuterol (PROAIR HFA/PROVENTIL HFA/VENTOLIN HFA) 108 (90 Base) MCG/ACT inhaler Inhale 2 puffs into the lungs every 6 hours 18 g 1     aspirin 81 mg chewable tablet [ASPIRIN 81 MG CHEWABLE TABLET] Chew 81 mg daily.       fexofenadine (ALLEGRA) 180 MG tablet Take 180 mg by mouth as needed       hydrochlorothiazide (HYDRODIURIL) 25 MG tablet TAKE 1 TABLET DAILY 90 tablet 3     lisinopril (ZESTRIL) 20 MG tablet TAKE 1 TABLET DAILY 90 tablet 3      "MULTIVITAMIN (MULTIPLE VITAMINS ORAL) [MULTIVITAMIN (MULTIPLE VITAMINS ORAL)] Take by mouth.       pravastatin (PRAVACHOL) 40 MG tablet TAKE 1 TABLET AT BEDTIME 90 tablet 3     triamcinolone (NASACORT AQ) 55 mcg nasal inhaler [TRIAMCINOLONE (NASACORT AQ) 55 MCG NASAL INHALER] 2 sprays into each nostril daily.         Allergies   Allergen Reactions     Atorvastatin Unknown     Myalgias     Crestor [Rosuvastatin] Unknown     Myalgias        Social History     Tobacco Use     Smoking status: Never     Smokeless tobacco: Never   Vaping Use     Vaping status: Not on file   Substance Use Topics     Alcohol use: Yes     Comment: Alcoholic Drinks/day: Two beers per month.  Sometimes goes a few months without.       History   Drug Use No         Objective     BP (!) 141/77   Pulse 66   Temp (!) 96.5  F (35.8  C) (Oral)   Resp 16   Ht 1.76 m (5' 9.29\")   Wt 80.3 kg (177 lb)   SpO2 96%   BMI 25.92 kg/m      /63 this morning at home    Physical Exam    GENERAL APPEARANCE: healthy, alert and no distress     EYES: EOMI,  PERRL     HENT: ear canals and TM's normal and nose and mouth without ulcers or lesions     NECK: no adenopathy, no asymmetry, masses, or scars and thyroid normal to palpation     RESP: lungs clear to auscultation - no rales, rhonchi or wheezes     CV: regular rates and rhythm, normal S1 S2, no S3 or S4 and no murmur, click or rub     ABDOMEN:  soft, nontender, no HSM or masses and bowel sounds normal     MS: extremities normal- no gross deformities noted, no evidence of inflammation in joints, FROM in all extremities.     SKIN: no suspicious lesions or rashes     NEURO: Normal strength and tone, sensory exam grossly normal, mentation intact and speech normal     PSYCH: mentation appears normal. and affect normal/bright     LYMPHATICS: No cervical adenopathy    Recent Labs   Lab Test 06/13/22  1027   HGB 14.9         POTASSIUM 4.7   CR 0.95        Diagnostics:  Labs pending at this time. "  Results will be reviewed when available.     EKG 12-lead, tracing only  Order: [id number]   Status: Preliminary result      Visible to patient: No (not released)      Next appt: None      Dx: Retina disorder, left; Pre-op exam      0 Result Notes           Component Ref Range & Units 5/9/23  8:21 AM 9/2/16  7:15 PM    Systolic Blood Pressure mmHg       Diastolic Blood Pressure mmHg       Ventricular Rate BPM 58  60     Atrial Rate BPM 58  60     MO Interval ms 150  176     QRS Duration ms 82  82     QT ms 398  392     QTc ms 390  392     P [place] degrees 29  63     R AXIS degrees 29  27     T [place] degrees 30  40     Interpretation ECG  Sinus bradycardia   Otherwise normal ECG   When compared with ECG of 02-SEP-2016 19:12,   No significant change was found  Normal sinus rhythm   Normal ECG   When compared with ECG of 25-MAY-2012 12:50,   No significant change was found   Confirmed by EBER MACDONALD MD LOC:[facility] ([id number]) on 9/3/2016 12:22:46 PM            Revised Cardiac Risk Index (RCRI):  The patient has the following serious cardiovascular risks for perioperative complications:   - No serious cardiac risks = 0 points     RCRI Interpretation: 0 points: Class I (very low risk - 0.4% complication rate)           Signed Electronically by: Randy Moreon MD  Copy of this evaluation report is provided to requesting physician.

## 2023-05-10 LAB — HBA1C MFR BLD: 6.4 % (ref 0–5.6)

## 2023-05-15 ENCOUNTER — PATIENT OUTREACH (OUTPATIENT)
Dept: CARE COORDINATION | Facility: CLINIC | Age: 68
End: 2023-05-15
Payer: COMMERCIAL

## 2023-05-30 ENCOUNTER — PATIENT OUTREACH (OUTPATIENT)
Dept: CARE COORDINATION | Facility: CLINIC | Age: 68
End: 2023-05-30
Payer: COMMERCIAL

## 2023-06-01 DIAGNOSIS — I10 HYPERTENSION: ICD-10-CM

## 2023-06-02 RX ORDER — HYDROCHLOROTHIAZIDE 25 MG/1
TABLET ORAL
Qty: 90 TABLET | Refills: 3 | Status: SHIPPED | OUTPATIENT
Start: 2023-06-02 | End: 2024-05-18

## 2023-06-02 NOTE — TELEPHONE ENCOUNTER
"Routing refill request to provider for review/approval because:  bp out of range    Last Written Prescription Date:  6/7/22  Last Fill Quantity: 90,  # refills: 3   Last office visit provider:  5/9/23     Requested Prescriptions   Pending Prescriptions Disp Refills     hydrochlorothiazide (HYDRODIURIL) 25 MG tablet [Pharmacy Med Name: HYDROCHLOROTHIAZIDE TABS 25MG] 90 tablet 3     Sig: TAKE 1 TABLET DAILY       Diuretics (Including Combos) Protocol Failed - 6/1/2023 11:48 PM        Failed - Blood pressure under 140/90 in past 12 months     BP Readings from Last 3 Encounters:   05/09/23 (!) 141/77   06/13/22 (!) 153/76   12/13/21 (!) 140/80                 Passed - Recent (12 mo) or future (30 days) visit within the authorizing provider's specialty     Patient has had an office visit with the authorizing provider or a provider within the authorizing providers department within the previous 12 mos or has a future within next 30 days. See \"Patient Info\" tab in inbasket, or \"Choose Columns\" in Meds & Orders section of the refill encounter.              Passed - Medication is active on med list        Passed - Patient is age 18 or older        Passed - Normal serum creatinine on file in past 12 months     Recent Labs   Lab Test 05/09/23  0839   CR 1.12              Passed - Normal serum potassium on file in past 12 months     Recent Labs   Lab Test 05/09/23  0839   POTASSIUM 4.4                    Passed - Normal serum sodium on file in past 12 months     Recent Labs   Lab Test 05/09/23  0839                      Justine Lockhart RN 06/02/23 12:25 PM  "

## 2023-06-26 DIAGNOSIS — I10 HYPERTENSION: ICD-10-CM

## 2023-06-26 RX ORDER — LISINOPRIL 20 MG/1
TABLET ORAL
Qty: 90 TABLET | Refills: 3 | Status: SHIPPED | OUTPATIENT
Start: 2023-06-26 | End: 2024-07-08

## 2023-06-26 NOTE — TELEPHONE ENCOUNTER
"Routing refill request to provider for review/approval because:  BP    Last Written Prescription Date:  6/29/22  Last Fill Quantity: 90,  # refills: 3   Last office visit provider:   5/9/23 with jenn    Requested Prescriptions   Pending Prescriptions Disp Refills     lisinopril (ZESTRIL) 20 MG tablet [Pharmacy Med Name: LISINOPRIL TABS 20MG] 90 tablet 3     Sig: TAKE 1 TABLET DAILY       ACE Inhibitors (Including Combos) Protocol Failed - 6/26/2023  2:12 PM        Failed - Blood pressure under 140/90 in past 12 months     BP Readings from Last 3 Encounters:   05/09/23 (!) 141/77   06/13/22 (!) 153/76   12/13/21 (!) 140/80                 Passed - Recent (12 mo) or future (30 days) visit within the authorizing provider's specialty     Patient has had an office visit with the authorizing provider or a provider within the authorizing providers department within the previous 12 mos or has a future within next 30 days. See \"Patient Info\" tab in inbasket, or \"Choose Columns\" in Meds & Orders section of the refill encounter.              Passed - Medication is active on med list        Passed - Patient is age 18 or older        Passed - Normal serum creatinine on file in past 12 months     Recent Labs   Lab Test 05/09/23  0839   CR 1.12       Ok to refill medication if creatinine is low          Passed - Normal serum potassium on file in past 12 months     Recent Labs   Lab Test 05/09/23  0839   POTASSIUM 4.4                  FIONA MEYER RN 06/26/23 2:12 PM  "
[FreeTextEntry1] : Continue conservative care.  Expect the nail to completely fall off and the new nail to grow in place over the next 3 to 6 months.  Call MD with any changes.

## 2023-06-28 DIAGNOSIS — E78.5 HYPERLIPIDEMIA: ICD-10-CM

## 2023-06-29 RX ORDER — PRAVASTATIN SODIUM 40 MG
TABLET ORAL
Qty: 90 TABLET | Refills: 3 | Status: SHIPPED | OUTPATIENT
Start: 2023-06-29 | End: 2024-07-08

## 2023-06-29 NOTE — TELEPHONE ENCOUNTER
"Routing refill request to provider for review/approval because:  Drug interaction warning  Labs not current:  ldl    Last Written Prescription Date:  7/4/22  Last Fill Quantity: 90,  # refills: 3   Last office visit provider:  5/9/23     Requested Prescriptions   Pending Prescriptions Disp Refills     pravastatin (PRAVACHOL) 40 MG tablet [Pharmacy Med Name: PRAVASTATIN TABS 40MG] 90 tablet 3     Sig: TAKE 1 TABLET AT BEDTIME       Statins Protocol Failed - 6/28/2023 11:53 PM        Failed - LDL on file in past 12 months     Recent Labs   Lab Test 06/13/22  1027   *             Passed - No abnormal creatine kinase in past 12 months     No lab results found.             Passed - Recent (12 mo) or future (30 days) visit within the authorizing provider's specialty     Patient has had an office visit with the authorizing provider or a provider within the authorizing providers department within the previous 12 mos or has a future within next 30 days. See \"Patient Info\" tab in inbasket, or \"Choose Columns\" in Meds & Orders section of the refill encounter.              Passed - Medication is active on med list        Passed - Patient is age 18 or older             Justine Lockhart RN 06/29/23 12:06 PM  "

## 2023-09-10 ENCOUNTER — HEALTH MAINTENANCE LETTER (OUTPATIENT)
Age: 68
End: 2023-09-10

## 2023-10-30 ENCOUNTER — OFFICE VISIT (OUTPATIENT)
Dept: FAMILY MEDICINE | Facility: CLINIC | Age: 68
End: 2023-10-30
Payer: COMMERCIAL

## 2023-10-30 VITALS
HEIGHT: 69 IN | HEART RATE: 63 BPM | BODY MASS INDEX: 24.59 KG/M2 | WEIGHT: 166 LBS | OXYGEN SATURATION: 98 % | SYSTOLIC BLOOD PRESSURE: 150 MMHG | DIASTOLIC BLOOD PRESSURE: 80 MMHG

## 2023-10-30 DIAGNOSIS — Z23 NEED FOR INFLUENZA VACCINATION: ICD-10-CM

## 2023-10-30 DIAGNOSIS — E78.5 DYSLIPIDEMIA: ICD-10-CM

## 2023-10-30 DIAGNOSIS — R73.03 PREDIABETES: ICD-10-CM

## 2023-10-30 DIAGNOSIS — Z12.11 COLON CANCER SCREENING: ICD-10-CM

## 2023-10-30 DIAGNOSIS — Z23 NEED FOR VACCINATION FOR STREP PNEUMONIAE: ICD-10-CM

## 2023-10-30 DIAGNOSIS — Z00.00 ENCOUNTER FOR MEDICARE ANNUAL WELLNESS EXAM: Primary | ICD-10-CM

## 2023-10-30 DIAGNOSIS — Z12.5 SCREENING PSA (PROSTATE SPECIFIC ANTIGEN): ICD-10-CM

## 2023-10-30 DIAGNOSIS — I10 ESSENTIAL HYPERTENSION, BENIGN: ICD-10-CM

## 2023-10-30 LAB
ALBUMIN SERPL BCG-MCNC: 4.6 G/DL (ref 3.5–5.2)
ALP SERPL-CCNC: 75 U/L (ref 40–129)
ALT SERPL W P-5'-P-CCNC: 35 U/L (ref 0–70)
ANION GAP SERPL CALCULATED.3IONS-SCNC: 13 MMOL/L (ref 7–15)
AST SERPL W P-5'-P-CCNC: 32 U/L (ref 0–45)
BILIRUB SERPL-MCNC: 0.8 MG/DL
BUN SERPL-MCNC: 29.9 MG/DL (ref 8–23)
CALCIUM SERPL-MCNC: 9.8 MG/DL (ref 8.8–10.2)
CHLORIDE SERPL-SCNC: 101 MMOL/L (ref 98–107)
CHOLEST SERPL-MCNC: 211 MG/DL
CREAT SERPL-MCNC: 1.08 MG/DL (ref 0.67–1.17)
DEPRECATED HCO3 PLAS-SCNC: 26 MMOL/L (ref 22–29)
EGFRCR SERPLBLD CKD-EPI 2021: 75 ML/MIN/1.73M2
ERYTHROCYTE [DISTWIDTH] IN BLOOD BY AUTOMATED COUNT: 12.4 % (ref 10–15)
GLUCOSE SERPL-MCNC: 103 MG/DL (ref 70–99)
HBA1C MFR BLD: 5.4 % (ref 0–5.6)
HCT VFR BLD AUTO: 44.3 % (ref 40–53)
HDLC SERPL-MCNC: 47 MG/DL
HGB BLD-MCNC: 15.2 G/DL (ref 13.3–17.7)
LDLC SERPL CALC-MCNC: 146 MG/DL
MCH RBC QN AUTO: 30.3 PG (ref 26.5–33)
MCHC RBC AUTO-ENTMCNC: 34.3 G/DL (ref 31.5–36.5)
MCV RBC AUTO: 88 FL (ref 78–100)
NONHDLC SERPL-MCNC: 164 MG/DL
PLATELET # BLD AUTO: 246 10E3/UL (ref 150–450)
POTASSIUM SERPL-SCNC: 3.8 MMOL/L (ref 3.4–5.3)
PROT SERPL-MCNC: 7.5 G/DL (ref 6.4–8.3)
PSA SERPL DL<=0.01 NG/ML-MCNC: 1.62 NG/ML (ref 0–4.5)
RBC # BLD AUTO: 5.01 10E6/UL (ref 4.4–5.9)
SODIUM SERPL-SCNC: 140 MMOL/L (ref 135–145)
TRIGL SERPL-MCNC: 90 MG/DL
WBC # BLD AUTO: 6.5 10E3/UL (ref 4–11)

## 2023-10-30 PROCEDURE — 36415 COLL VENOUS BLD VENIPUNCTURE: CPT | Performed by: FAMILY MEDICINE

## 2023-10-30 PROCEDURE — 85027 COMPLETE CBC AUTOMATED: CPT | Performed by: FAMILY MEDICINE

## 2023-10-30 PROCEDURE — 90662 IIV NO PRSV INCREASED AG IM: CPT | Performed by: FAMILY MEDICINE

## 2023-10-30 PROCEDURE — 90472 IMMUNIZATION ADMIN EACH ADD: CPT | Performed by: FAMILY MEDICINE

## 2023-10-30 PROCEDURE — 80061 LIPID PANEL: CPT | Performed by: FAMILY MEDICINE

## 2023-10-30 PROCEDURE — 80053 COMPREHEN METABOLIC PANEL: CPT | Performed by: FAMILY MEDICINE

## 2023-10-30 PROCEDURE — 90677 PCV20 VACCINE IM: CPT | Performed by: FAMILY MEDICINE

## 2023-10-30 PROCEDURE — 83036 HEMOGLOBIN GLYCOSYLATED A1C: CPT | Performed by: FAMILY MEDICINE

## 2023-10-30 PROCEDURE — 99397 PER PM REEVAL EST PAT 65+ YR: CPT | Mod: 25 | Performed by: FAMILY MEDICINE

## 2023-10-30 PROCEDURE — G0103 PSA SCREENING: HCPCS | Performed by: FAMILY MEDICINE

## 2023-10-30 PROCEDURE — 90471 IMMUNIZATION ADMIN: CPT | Performed by: FAMILY MEDICINE

## 2023-10-30 PROCEDURE — 99214 OFFICE O/P EST MOD 30 MIN: CPT | Mod: 25 | Performed by: FAMILY MEDICINE

## 2023-10-30 RX ORDER — FLUTICASONE FUROATE AND VILANTEROL 100; 25 UG/1; UG/1
1 POWDER RESPIRATORY (INHALATION) DAILY
COMMUNITY
Start: 2023-10-24

## 2023-10-30 ASSESSMENT — ENCOUNTER SYMPTOMS
DYSURIA: 0
SORE THROAT: 0
NERVOUS/ANXIOUS: 0
MYALGIAS: 0
ARTHRALGIAS: 0
DIZZINESS: 0
HEMATURIA: 0
COUGH: 0
PALPITATIONS: 0
CHILLS: 0
WEAKNESS: 0
DIARRHEA: 0
HEMATOCHEZIA: 0
FREQUENCY: 0
NAUSEA: 0
HEARTBURN: 0
HEADACHES: 0
ABDOMINAL PAIN: 0
SHORTNESS OF BREATH: 0
EYE PAIN: 0
JOINT SWELLING: 0
PARESTHESIAS: 0
CONSTIPATION: 0
FEVER: 0

## 2023-10-30 ASSESSMENT — ACTIVITIES OF DAILY LIVING (ADL): CURRENT_FUNCTION: NO ASSISTANCE NEEDED

## 2023-10-30 NOTE — PATIENT INSTRUCTIONS
Wisam due in April 2024    High dose flu vaccine today    Prevnar 20 today    Get your Covid vaccine in a week or two.    Fasting labs        Patient Education   Personalized Prevention Plan  You are due for the preventive services outlined below.  Your care team is available to assist you in scheduling these services.  If you have already completed any of these items, please share that information with your care team to update in your medical record.  Health Maintenance Due   Topic Date Due    RSV VACCINE 60+ (1 - 1-dose 60+ series) Never done    Pneumococcal Vaccine (3 - PPSV23 or PCV20) 10/14/2021    Annual Wellness Visit  06/13/2023    ANNUAL REVIEW OF HM ORDERS  06/13/2023    Flu Vaccine (1) 09/01/2023    COVID-19 Vaccine (6 - 2023-24 season) 09/01/2023     Learning About Dietary Guidelines  What are the Dietary Guidelines for Americans?     Dietary Guidelines for Americans provide tips for eating well and staying healthy. This helps reduce the risk for long-term (chronic) diseases.  These guidelines recommend that you:  Eat and drink the right amount for you. The U.[place] government's food guide is called MyPlate. It can help you make your own well-balanced eating plan.  Try to balance your eating with your activity. This helps you stay at a healthy weight.  Drink alcohol in moderation, if at all.  Limit foods high in salt, saturated fat, trans fat, and added sugar.  These guidelines are from the U.S. Department of Agriculture and the U.S. Department of Health and Human Services. They are updated every 5 years.  What is MyPlate?  MyPlate is the U.S. government's food guide. It can help you make your own well-balanced eating plan. A balanced eating plan means that you eat enough, but not too much, and that your food gives you the nutrients you need to stay healthy.  MyPlate focuses on eating plenty of whole grains, fruits, and vegetables, and on limiting fat and sugar. It is available online at  "www.ChooseMyPlate.gov.  How can you get started?  If you're trying to eat healthier, you can slowly change your eating habits over time. You don't have to make big changes all at once. Start by adding one or two healthy foods to your meals each day.  Grains  Choose whole-grain breads and cereals and whole-wheat pasta and whole-grain crackers.  Vegetables  Eat a variety of vegetables every day. They have lots of nutrients and are part of a heart-healthy diet.  Fruits  Eat a variety of fruits every day. Fruits contain lots of nutrients. Choose fresh fruit instead of fruit juice.  Protein foods  Choose fish and lean poultry more often. Eat red meat and fried meats less often. Dried beans, tofu, and nuts are also good sources of protein.  Dairy  Choose low-fat or fat-free products from this food group. If you have problems digesting milk, try eating cheese or yogurt instead.  Fats and oils  Limit fats and oils if you're trying to cut calories. Choose healthy fats when you cook. These include canola oil and olive oil.  Where can you learn more?  Go to https://www.healthDick or Bro.net/patiented  Enter D676 in the search box to learn more about \"Learning About Dietary Guidelines.\"  Current as of: March 1, 2023               Content Version: 13.7 2006-2023 [employer].   Care instructions adapted under license by your healthcare professional. If you have questions about a medical condition or this instruction, always ask your healthcare professional. [employer] disclaims any warranty or liability for your use of this information.         "

## 2023-10-30 NOTE — PROGRESS NOTES
"SUBJECTIVE:   Dorothy is a 68 year old who presents for Preventive Visit.      10/30/2023     6:58 AM   Additional Questions   Roomed by ADRIANE Hickman CMA   Accompanied by -       Are you in the first 12 months of your Medicare coverage?  No    Healthy Habits:     In general, how would you rate your overall health?  Excellent    Frequency of exercise:  2-3 days/week    Duration of exercise:  45-60 minutes    Do you usually eat at least 4 servings of fruit and vegetables a day, include whole grains    & fiber and avoid regularly eating high fat or \"junk\" foods?  No    Taking medications regularly:  Yes    Medication side effects:  None    Ability to successfully perform activities of daily living:  No assistance needed    Home Safety:  No safety concerns identified    Hearing Impairment:  No hearing concerns    In the past 6 months, have you been bothered by leaking of urine?  No    In general, how would you rate your overall mental or emotional health?  Excellent    Additional concerns today:  Yes          Have you ever done Advance Care Planning? (For example, a Health Directive, POLST, or a discussion with a medical provider or your loved ones about your wishes): No, advance care planning information given to patient to review.  Advanced care planning was discussed at today's visit.       Fall risk  Fallen 2 or more times in the past year?: No  Any fall with injury in the past year?: No    Cognitive Screening   1) Repeat 3 items (Leader, Season, Table)    2) Clock draw: NORMAL  3) 3 item recall: Recalls 1 object   Results: NORMAL clock, 1-2 items recalled: COGNITIVE IMPAIRMENT LESS LIKELY    Mini-CogTM Copyright COLLEEN Florentino. Licensed by the author for use in [facility]; reprinted with permission (ruddy@.[facility]). All rights reserved.      Do you have sleep apnea, excessive snoring or daytime drowsiness? : no    Reviewed and updated as needed this visit by clinical staff   Tobacco  Allergies  Meds          "     Reviewed and updated as needed this visit by Provider                 Social History     Tobacco Use    Smoking status: Never    Smokeless tobacco: Never   Substance Use Topics    Alcohol use: Yes     Comment: Alcoholic Drinks/day: Two beers per month.  Sometimes goes a few months without.             10/28/2023    10:22 AM   Alcohol Use   Prescreen: >3 drinks/day or >7 drinks/week? No          No data to display              Do you have a current opioid prescription? No  Do you use any other controlled substances or medications that are not prescribed by a provider? None          Current providers sharing in care for this patient include:   Patient Care Team:  Randy Moreno MD as PCP - General  Randy Moreno MD as Assigned PCP    The following health maintenance items are reviewed in Epic and correct as of today:  Health Maintenance   Topic Date Due    RSV VACCINE 60+ (1 - 1-dose 60+ series) Never done    Pneumococcal Vaccine: 65+ Years (3 - PPSV23 or PCV20) 10/14/2021    MEDICARE ANNUAL WELLNESS VISIT  06/13/2023    INFLUENZA VACCINE (1) 09/01/2023    COVID-19 Vaccine (6 - 2023-24 season) 09/01/2023    COLORECTAL CANCER SCREENING  04/09/2024    ANNUAL REVIEW OF HM ORDERS  10/30/2024    FALL RISK ASSESSMENT  10/30/2024    DTAP/TDAP/TD IMMUNIZATION (4 - Td or Tdap) 10/14/2026    LIPID  06/13/2027    ADVANCE CARE PLANNING  10/30/2028    HEPATITIS C SCREENING  Completed    PHQ-2 (once per calendar year)  Completed    ZOSTER IMMUNIZATION  Completed    AORTIC ANEURYSM SCREENING (SYSTEM ASSIGNED)  Completed    IPV IMMUNIZATION  Aged Out    HPV IMMUNIZATION  Aged Out    MENINGITIS IMMUNIZATION  Aged Out               Review of Systems   Constitutional:  Negative for chills and fever.   HENT:  Negative for congestion, ear pain, hearing loss and sore throat.    Eyes:  Positive for visual disturbance. Negative for pain.   Respiratory:  Negative for cough and shortness of breath.    Cardiovascular:  Negative for  "chest pain, palpitations and peripheral edema.   Gastrointestinal:  Negative for abdominal pain, constipation, diarrhea, heartburn, hematochezia and nausea.   Genitourinary:  Negative for dysuria, frequency, genital sores, hematuria, impotence, penile discharge and urgency.   Musculoskeletal:  Negative for arthralgias, joint swelling and myalgias.   Skin:  Negative for rash.   Neurological:  Negative for dizziness, weakness, headaches and paresthesias.   Psychiatric/Behavioral:  Negative for mood changes. The patient is not nervous/anxious.      Home Bps well controlled    Home monitor reading today in clinic:  172/76    OBJECTIVE:   BP (!) 150/80   Pulse 63   Ht 1.759 m (5' 9.25\")   Wt 75.3 kg (166 lb)   SpO2 98%   BMI 24.34 kg/m   Estimated body mass index is 24.34 kg/m  as calculated from the following:    Height as of this encounter: 1.759 m (5' 9.25\").    Weight as of this encounter: 75.3 kg (166 lb).  Physical Exam  GENERAL: healthy, alert and no distress  EYES: Eyes grossly normal to inspection, PERRL and conjunctivae and sclerae normal  HENT: ear canals and TM's normal, nose and mouth without ulcers or lesions  NECK: no adenopathy, no asymmetry, masses, or scars and thyroid normal to palpation  RESP: lungs clear to auscultation - no rales, rhonchi or wheezes  CV: regular rate and rhythm, normal S1 S2, no S3 or S4, no murmur, click or rub, no peripheral edema and peripheral pulses strong  ABDOMEN: soft, nontender, no hepatosplenomegaly, no masses and bowel sounds normal  :  EXAM DECLINED.  Will do annual PSA  MS: no gross musculoskeletal defects noted, no edema  SKIN: no suspicious lesions or rashes  NEURO: Normal strength and tone, mentation intact and speech normal  PSYCH: mentation appears normal, affect normal/bright    Lab Results   Component Value Date    A1C 6.4 05/09/2023           ASSESSMENT / PLAN:       ICD-10-CM    1. Encounter for Medicare annual wellness exam  Z00.00 REVIEW OF HEALTH " "MAINTENANCE PROTOCOL ORDERS     CBC with platelets      2. Dyslipidemia, stable on pravastatin E78.5 Lipid Profile (Chol, Trig, HDL, LDL calc)      3. Benign Essential Hypertension, well controlled I10 Comprehensive metabolic panel (BMP + Alb, Alk Phos, ALT, AST, Total. Bili, TP)      4. Need for influenza vaccination  Z23 INFLUENZA VACCINE 65+ (FLUZONE HD)      5. Need for vaccination for Strep pneumoniae  Z23 PNEUMOCOCCAL 20 VALENT CONJUGATE (PREVNAR 20)      6. Colon cancer screening, due aril 2024 Z12.11 COLOGUARD(EXACT SCIENCES)      7. Screening PSA (prostate specific antigen)  Z12.5 PSA, screen      8. Prediabetes  R73.03 Hemoglobin A1c          PLAN:  Cologuard due in April 2024    High dose flu vaccine today    Prevnar 20 today    Get your Covid vaccine in a week or two.    Fasting labs        Patient has been advised of split billing requirements and indicates understanding: Yes      COUNSELING:  Reviewed preventive health counseling, as reflected in patient instructions       Regular exercise       Healthy diet/nutrition       Colon cancer screening       Prostate cancer screening      BMI:   Estimated body mass index is 24.34 kg/m  as calculated from the following:    Height as of this encounter: 1.759 m (5' 9.25\").    Weight as of this encounter: 75.3 kg (166 lb).         He reports that he has never smoked. He has never used smokeless tobacco.      Appropriate preventive services were discussed with this patient, including applicable screening as appropriate for fall prevention, nutrition, physical activity, Tobacco-use cessation, weight loss and cognition.  Checklist reviewing preventive services available has been given to the patient.    Reviewed patients plan of care and provided an AVS. The Basic Care Plan (routine screening as documented in Health Maintenance) for Antony meets the Care Plan requirement. This Care Plan has been established and reviewed with the Patient.    Randy Moreno MD  M " "[facility]    Identified Health Risks:    Answers submitted by the patient for this visit:  Annual Preventive Visit (Submitted on 10/28/2023)  Chief Complaint: Annual Exam:  In general, how would you rate your overall physical health?: excellent  Frequency of exercise:: 2-3 days/week  Do you usually eat at least 4 servings of fruit and vegetables a day, include whole grains & fiber, and avoid regularly eating high fat or \"junk\" foods? : No  Taking medications regularly:: Yes  Medication side effects:: None  Activities of Daily Living: no assistance needed  Home safety: no safety concerns identified  Hearing Impairment:: no hearing concerns  In the past 6 months, have you been bothered by leaking of urine?: No  abdominal pain: No  Blood in stool: No  Blood in urine: No  chest pain: No  chills: No  congestion: No  constipation: No  cough: No  diarrhea: No  dizziness: No  ear pain: No  eye pain: No  nervous/anxious: No  fever: No  frequency: No  genital sores: No  headaches: No  hearing loss: No  heartburn: No  arthralgias: No  joint swelling: No  peripheral edema: No  mood changes: No  myalgias: No  nausea: No  dysuria: No  palpitations: No  Skin sensation changes: No  sore throat: No  urgency: No  rash: No  shortness of breath: No  visual disturbance: Yes  weakness: No  impotence: No  penile discharge: No  In general, how would you rate your overall mental or emotional health?: excellent  Additional concerns today:: Yes  Exercise outside of work (Submitted on 10/28/2023)  Chief Complaint: Annual Exam:  Duration of exercise:: 45-60 minutes    "

## 2023-10-30 NOTE — PROGRESS NOTES
The patient was counseled and encouraged to consider modifying their diet and eating habits. He was provided with information on recommended healthy diet options.

## 2023-10-30 NOTE — LETTER
November 1, 2023      Dorothy Ferguson  [address] [place] [facility] [address]        Dear ,  We are writing to inform you of your test results.    Hi Pat:  The cholesterol and LDL are mildly elevated.  Make sure you are taking your statin med daily and following a diet low in saturated fat.  Your A1C was normal and blood sugar upper normal. Check yearly.   The PSA was normal but slightly up from last year.  Recheck the PSA in one year.  The remaining labs are normal.       Resulted Orders   Lipid Profile (Chol, Trig, HDL, LDL calc)   Result Value Ref Range    Cholesterol 211 (H) <200 mg/dL    Triglycerides 90 <150 mg/dL    Direct Measure HDL 47 >=40 mg/dL    LDL Cholesterol Calculated 146 (H) <=100 mg/dL    Non HDL Cholesterol 164 (H) <130 mg/dL    Narrative    Cholesterol  Desirable:  <200 mg/dL    Triglycerides  Normal:  Less than 150 mg/dL  Borderline High:  150-199 mg/dL  High:  200-499 mg/dL  Very High:  Greater than or equal to 500 mg/dL    Direct Measure HDL  Female:  Greater than or equal to 50 mg/dL   Male:  Greater than or equal to 40 mg/dL    LDL Cholesterol  Desirable:  <100mg/dL  Above Desirable:  100-129 mg/dL   Borderline High:  130-159 mg/dL   High:  160-189 mg/dL   Very High:  >= 190 mg/dL    Non HDL Cholesterol  Desirable:  130 mg/dL  Above Desirable:  130-159 mg/dL  Borderline High:  160-189 mg/dL  High:  190-219 mg/dL  Very High:  Greater than or equal to 220 mg/dL   Comprehensive metabolic panel (BMP + Alb, Alk Phos, ALT, AST, Total. Bili, TP)   Result Value Ref Range    Sodium 140 135 - 145 mmol/L      Comment:      Reference intervals for this test were updated on 09/26/2023 to more accurately reflect our healthy population. There may be differences in the flagging of prior results with similar values performed with this method. Interpretation of those prior results can be made in the context of the updated reference intervals.     Potassium 3.8 3.4 - 5.3 mmol/L    Carbon Dioxide (CO2)  26 22 - 29 mmol/L    Anion Gap 13 7 - 15 mmol/L    Urea Nitrogen 29.9 (H) 8.0 - 23.0 mg/dL    Creatinine 1.08 0.67 - 1.17 mg/dL    GFR Estimate 75 >60 mL/min/1.73m2    Calcium 9.8 8.8 - 10.2 mg/dL    Chloride 101 98 - 107 mmol/L    Glucose 103 (H) 70 - 99 mg/dL    Alkaline Phosphatase 75 40 - 129 U/L    AST 32 0 - 45 U/L      Comment:      Reference intervals for this test were updated on 6/12/2023 to more accurately reflect our healthy population. There may be differences in the flagging of prior results with similar values performed with this method. Interpretation of those prior results can be made in the context of the updated reference intervals.    ALT 35 0 - 70 U/L      Comment:      Reference intervals for this test were updated on 6/12/2023 to more accurately reflect our healthy population. There may be differences in the flagging of prior results with similar values performed with this method. Interpretation of those prior results can be made in the context of the updated reference intervals.      Protein Total 7.5 6.4 - 8.3 g/dL    Albumin 4.6 3.5 - 5.2 g/dL    Bilirubin Total 0.8 <=1.2 mg/dL   CBC with platelets   Result Value Ref Range    WBC Count 6.5 4.0 - 11.0 10e3/uL    RBC Count 5.01 4.40 - 5.90 10e6/uL    Hemoglobin 15.2 13.3 - 17.7 g/dL    Hematocrit 44.3 40.0 - 53.0 %    MCV 88 78 - 100 fL    MCH 30.3 26.5 - 33.0 pg    MCHC 34.3 31.5 - 36.5 g/dL    RDW 12.4 10.0 - 15.0 %    Platelet Count 246 150 - 450 10e3/uL   PSA, screen   Result Value Ref Range    Prostate Specific Antigen Screen 1.62 0.00 - 4.50 ng/mL    Narrative    This result is obtained using the Roche Elecsys total PSA method on the syed [id number] immunoassay analyzer. Results obtained with different assay methods or kits cannot be used interchangeably.   Hemoglobin A1c   Result Value Ref Range    Hemoglobin A1C 5.4 0.0 - 5.6 %      Comment:      Normal <5.7%   Prediabetes 5.7-6.4%    Diabetes 6.5% or higher     Note: Adopted from ADA  consensus guidelines.       If you have any questions or concerns, please call the clinic at the number listed above.       Sincerely,      Randy Moreno MD

## 2023-11-13 ENCOUNTER — OFFICE VISIT (OUTPATIENT)
Dept: FAMILY MEDICINE | Facility: CLINIC | Age: 68
End: 2023-11-13
Payer: COMMERCIAL

## 2023-11-13 VITALS
WEIGHT: 165.2 LBS | HEART RATE: 57 BPM | BODY MASS INDEX: 24.47 KG/M2 | HEIGHT: 69 IN | RESPIRATION RATE: 16 BRPM | OXYGEN SATURATION: 99 % | DIASTOLIC BLOOD PRESSURE: 75 MMHG | SYSTOLIC BLOOD PRESSURE: 144 MMHG | TEMPERATURE: 97.4 F

## 2023-11-13 DIAGNOSIS — I10 ESSENTIAL HYPERTENSION, BENIGN: ICD-10-CM

## 2023-11-13 DIAGNOSIS — H25.9 SENILE CATARACT OF LEFT EYE, UNSPECIFIED AGE-RELATED CATARACT TYPE: ICD-10-CM

## 2023-11-13 DIAGNOSIS — Z01.818 PRE-OP EXAM: Primary | ICD-10-CM

## 2023-11-13 DIAGNOSIS — E78.5 DYSLIPIDEMIA: ICD-10-CM

## 2023-11-13 PROCEDURE — 99214 OFFICE O/P EST MOD 30 MIN: CPT | Performed by: FAMILY MEDICINE

## 2023-11-13 NOTE — PROGRESS NOTES
[facility]  [address] [facility] [address]  Phone: [phone]  Fax: [phone]  Primary Provider: Nicanor Moreno  Pre-op Performing Provider: NICANOR MORENO      PREOPERATIVE EVALUATION:  Today's date: 11/13/2023    Dorothy is a 68 year old male who presents for a preoperative evaluation.      11/13/2023    10:30 AM   Additional Questions   Roomed by Carmen ULLOA LPN       Surgical Information:  Surgery/Procedure: Cataract Surgery Left Eye  Surgery Location: [facility]   Surgeon: Dr. Denise  Surgery Date: 12/07/23   Time of Surgery:   Where patient plans to recover: At home with family  Fax number for surgical facility: [phone]    Assessment & Plan     The proposed surgical procedure is considered LOW risk.    Encounter Diagnoses   Name Primary?    Pre-op exam Yes    Senile cataract of left eye, unspecified age-related cataract type     Benign Essential Hypertension     Dyslipidemia          - No identified additional risk factors other than previously addressed    Antiplatelet or Anticoagulation Medication Instructions:   - Bleeding risk is low for this procedure (e.g. dental, skin, cataract).    Additional Medication Instructions:  Patient is to take all scheduled medications on the day of surgery    RECOMMENDATION:  APPROVAL GIVEN to proceed with proposed procedure, without further diagnostic evaluation.      }  Subjective       HPI related to upcoming procedure: the cataract is becoming more bothersome, especially at night.        11/10/2023     1:09 PM   Preop Questions   1. Have you ever had a heart attack or stroke? No   2. Have you ever had surgery on your heart or blood vessels, such as a stent placement, a coronary artery bypass, or surgery on an artery in your head, neck, heart, or legs? No   3. Do you have chest pain with activity? No   4. Do you have a history of  heart failure? No   5. Do you currently have a cold, bronchitis or symptoms of other  infection? No   6. Do you have a cough, shortness of breath, or wheezing? No   7. Do you or anyone in your family have previous history of blood clots? No   8. Do you or does anyone in your family have a serious bleeding problem such as prolonged bleeding following surgeries or cuts? No   9. Have you ever had problems with anemia or been told to take iron pills? No   10. Have you had any abnormal blood loss such as black, tarry or bloody stools? No   11. Have you ever had a blood transfusion? No   12. Are you willing to have a blood transfusion if it is medically needed before, during, or after your surgery? Yes   13. Have you or any of your relatives ever had problems with anesthesia? No   14. Do you have sleep apnea, excessive snoring or daytime drowsiness? No   15. Do you have any artifical heart valves or other implanted medical devices like a pacemaker, defibrillator, or continuous glucose monitor? No   16. Do you have artificial joints? No   17. Are you allergic to latex? No       Health Care Directive:  Patient does not have a Health Care Directive or Living Will: Patient states has Advance Directive and will bring in a copy to clinic.    Preoperative Review of :   reviewed - no record of controlled substances prescribed.      Review of Systems  CONSTITUTIONAL: NEGATIVE for fever, chills, change in weight  INTEGUMENTARY/SKIN: NEGATIVE for worrisome rashes, moles or lesions  EYES: NEGATIVE for vision changes or irritation  ENT/MOUTH: NEGATIVE for ear, mouth and throat problems  RESP: NEGATIVE for significant cough or SOB  CV: NEGATIVE for chest pain, palpitations or peripheral edema  GI: NEGATIVE for nausea, abdominal pain, heartburn, or change in bowel habits  : NEGATIVE for frequency, dysuria, or hematuria  MUSCULOSKELETAL: NEGATIVE for significant arthralgias or myalgia  NEURO: NEGATIVE for weakness, dizziness or paresthesias  ENDOCRINE: NEGATIVE for temperature intolerance, skin/hair changes  HEME:  NEGATIVE for bleeding problems  PSYCHIATRIC: NEGATIVE for changes in mood or affect        Patient Active Problem List    Diagnosis Date Noted    Benign Essential Hypertension      Priority: Medium     Created by Conversion        Dyslipidemia      Priority: Medium     Created by Conversion        Webbed toes of both feet - harmless and likely to be passed on to your children 50% of the time. 10/26/2015     Priority: Medium    Sarcoidosis - found on lymph node biopsy 06/18/2015     Priority: Medium    Allergic Rhinitis Due To Pollen      Priority: Medium     Created by Conversion          Past Medical History:   Diagnosis Date    Hypertension      Past Surgical History:   Procedure Laterality Date    KS BIOPSY/EXCISION, LYMPH NODE(S)      Description: Biopsy Lymph Node;  Recorded: 09/02/2014;     Current Outpatient Medications   Medication Sig Dispense Refill    aspirin 81 mg chewable tablet [ASPIRIN 81 MG CHEWABLE TABLET] Chew 81 mg daily.      fexofenadine (ALLEGRA) 180 MG tablet Take 180 mg by mouth as needed      fluticasone-vilanterol (BREO ELLIPTA) 100-25 MCG/ACT inhaler Inhale 1 puff into the lungs daily      hydrochlorothiazide (HYDRODIURIL) 25 MG tablet TAKE 1 TABLET DAILY 90 tablet 3    lisinopril (ZESTRIL) 20 MG tablet TAKE 1 TABLET DAILY 90 tablet 3    MULTIVITAMIN (MULTIPLE VITAMINS ORAL) [MULTIVITAMIN (MULTIPLE VITAMINS ORAL)] Take by mouth.      pravastatin (PRAVACHOL) 40 MG tablet TAKE 1 TABLET AT BEDTIME 90 tablet 3    triamcinolone (NASACORT AQ) 55 mcg nasal inhaler [TRIAMCINOLONE (NASACORT AQ) 55 MCG NASAL INHALER] 2 sprays into each nostril daily.         Allergies   Allergen Reactions    Atorvastatin Unknown     Myalgias    Crestor [Rosuvastatin] Unknown     Myalgias        Social History     Tobacco Use    Smoking status: Never    Smokeless tobacco: Never   Substance Use Topics    Alcohol use: Yes     Comment: Alcoholic Drinks/day: Two beers per month.  Sometimes goes a few months without.  "      History   Drug Use No         Objective     BP (!) 141/70   Pulse 59   Temp 97.4  F (36.3  C) (Oral)   Resp 16   Ht 1.759 m (5' 9.25\")   Wt 74.9 kg (165 lb 3.2 oz)   SpO2 99%   BMI 24.22 kg/m      Physical Exam    GENERAL APPEARANCE: healthy, alert and no distress     EYES: EOMI,  PERRL     HENT: ear canals and TM's normal and nose and mouth without ulcers or lesions     NECK: no adenopathy, no asymmetry, masses, or scars and thyroid normal to palpation     RESP: lungs clear to auscultation - no rales, rhonchi or wheezes     CV: regular rates and rhythm, normal S1 S2, no S3 or S4 and no murmur, click or rub     ABDOMEN:  soft, nontender, no HSM or masses and bowel sounds normal     MS: extremities normal- no gross deformities noted, no evidence of inflammation in joints, FROM in all extremities.     SKIN: no suspicious lesions or rashes     NEURO: Normal strength and tone, sensory exam grossly normal, mentation intact and speech normal     PSYCH: mentation appears normal. and affect normal/bright     LYMPHATICS: No cervical adenopathy    Recent Labs   Lab Test 10/30/23  0751 05/09/23  0839   HGB 15.2 15.1    273    142   POTASSIUM 3.8 4.4   CR 1.08 1.12   A1C 5.4 6.4*        Diagnostics:  No labs were ordered during this visit.   No EKG required for low risk surgery (cataract, skin procedure, breast biopsy, etc).    Revised Cardiac Risk Index (RCRI):  The patient has the following serious cardiovascular risks for perioperative complications:   - No serious cardiac risks = 0 points     RCRI Interpretation: 0 points: Class I (very low risk - 0.4% complication rate)         Signed Electronically by: Randy Moreno MD  Copy of this evaluation report is provided to requesting physician.      "

## 2024-04-01 ENCOUNTER — ORDERS ONLY (AUTO-RELEASED) (OUTPATIENT)
Dept: FAMILY MEDICINE | Facility: CLINIC | Age: 69
End: 2024-04-01
Payer: COMMERCIAL

## 2024-04-01 DIAGNOSIS — Z12.11 COLON CANCER SCREENING: ICD-10-CM

## 2024-04-20 LAB — NONINV COLON CA DNA+OCC BLD SCRN STL QL: NEGATIVE

## 2024-05-18 ENCOUNTER — OFFICE VISIT (OUTPATIENT)
Dept: FAMILY MEDICINE | Facility: CLINIC | Age: 69
End: 2024-05-18
Payer: COMMERCIAL

## 2024-05-18 VITALS
OXYGEN SATURATION: 97 % | HEART RATE: 62 BPM | BODY MASS INDEX: 24.63 KG/M2 | WEIGHT: 168 LBS | TEMPERATURE: 97.8 F | DIASTOLIC BLOOD PRESSURE: 74 MMHG | RESPIRATION RATE: 16 BRPM | SYSTOLIC BLOOD PRESSURE: 168 MMHG

## 2024-05-18 DIAGNOSIS — K12.0 APHTHOUS STOMATITIS: Primary | ICD-10-CM

## 2024-05-18 DIAGNOSIS — B37.0 THRUSH: ICD-10-CM

## 2024-05-18 PROCEDURE — 99214 OFFICE O/P EST MOD 30 MIN: CPT | Performed by: STUDENT IN AN ORGANIZED HEALTH CARE EDUCATION/TRAINING PROGRAM

## 2024-05-18 RX ORDER — DEXAMETHASONE 0.5 MG/5ML
0.5 ELIXIR ORAL 3 TIMES DAILY
Qty: 105 ML | Refills: 0 | Status: SHIPPED | OUTPATIENT
Start: 2024-05-18 | End: 2024-05-30

## 2024-05-18 RX ORDER — NYSTATIN 100000/ML
500000 SUSPENSION, ORAL (FINAL DOSE FORM) ORAL 2 TIMES DAILY
Qty: 70 ML | Refills: 0 | Status: SHIPPED | OUTPATIENT
Start: 2024-05-18 | End: 2024-05-25

## 2024-05-18 NOTE — PROGRESS NOTES
Assessment & Plan     Aphthous stomatitis  Patient presents with mouth sores x 7-8 days that are increasing in number. He has had no recent illnesses or stressors. He uses a steroid inhaler 2-3 times a week if he is exposed to environmental triggers. Has contact with 2 year old grandson but he has been well. We discussed possible causes including aphthous stomatitis, HFM viral illness, thrush, lichen planus. I advised treating with oral steroid TID for aphthous stomatitis and also with nystatin to cover for yeast as part of the etiology since he does use a steroid inhaler and also may increase symptoms if there is yeast in the mouth and treating with decadron elixir. Advised bland diet, avoiding acidic and spicy foods. Advised to change out toothbrush today and again in one week and to see dentist if symptoms are not improving or worsening.   - dexAMETHasone (DECADRON) 0.5 MG/5ML elixir  Dispense: 105 mL; Refill: 0    Thrush  - nystatin (MYCOSTATIN) 599376 UNIT/ML suspension  Dispense: 70 mL; Refill: 0     30 minutes spent on the date of the encounter doing chart review, patient visit, and documentation       No follow-ups on file.    DOTTIE Michel CNP  [facility] is a 69 year old male who presents to clinic today for the following health issues:  Chief Complaint   Patient presents with    Lesion     Lesion in mouth x 1 week      HPI  No tobacco use or smoking. No new medications or changes in diet.     Review of Systems  Constitutional, HEENT, cardiovascular, pulmonary, gi and gu systems are negative, except as otherwise noted.      Objective    BP (!) 168/74   Pulse 62   Temp 97.8  F (36.6  C)   Resp 16   Wt 76.2 kg (168 lb)   SpO2 97%   BMI 24.63 kg/m    Physical Exam   GENERAL: alert and no distress  EYES: Eyes grossly normal to inspection, PERRL and conjunctivae and sclerae normal  HENT: normal cephalic/atraumatic, ear canals and TM's normal, oral  mucous membranes moist, and shallow pinkish-cream colored ulceration in buccal mucosa, posterior to left lower back molar, posterior palate and lower anterior gumline   MS: no gross musculoskeletal defects noted, no edema  SKIN: no suspicious lesions or rashes  NEURO: Normal strength and tone, mentation intact and speech normal

## 2024-05-21 ENCOUNTER — OFFICE VISIT (OUTPATIENT)
Dept: PODIATRY | Facility: CLINIC | Age: 69
End: 2024-05-21
Payer: COMMERCIAL

## 2024-05-21 VITALS — HEIGHT: 69 IN | WEIGHT: 168 LBS | BODY MASS INDEX: 24.88 KG/M2 | OXYGEN SATURATION: 92 % | HEART RATE: 64 BPM

## 2024-05-21 DIAGNOSIS — M72.2 PLANTAR FASCIITIS: Primary | ICD-10-CM

## 2024-05-21 PROCEDURE — 99203 OFFICE O/P NEW LOW 30 MIN: CPT | Mod: 25 | Performed by: PODIATRIST

## 2024-05-21 PROCEDURE — 20550 NJX 1 TENDON SHEATH/LIGAMENT: CPT | Mod: LT | Performed by: PODIATRIST

## 2024-05-21 RX ORDER — LIDOCAINE HYDROCHLORIDE 20 MG/ML
1 INJECTION, SOLUTION INFILTRATION; PERINEURAL ONCE
Status: COMPLETED | OUTPATIENT
Start: 2024-05-21 | End: 2024-05-21

## 2024-05-21 RX ORDER — TRIAMCINOLONE ACETONIDE 40 MG/ML
40 INJECTION, SUSPENSION INTRA-ARTICULAR; INTRAMUSCULAR ONCE
Status: COMPLETED | OUTPATIENT
Start: 2024-05-21 | End: 2024-05-21

## 2024-05-21 RX ADMIN — LIDOCAINE HYDROCHLORIDE 1 ML: 20 INJECTION, SOLUTION INFILTRATION; PERINEURAL at 09:29

## 2024-05-21 RX ADMIN — TRIAMCINOLONE ACETONIDE 40 MG: 40 INJECTION, SUSPENSION INTRA-ARTICULAR; INTRAMUSCULAR at 09:30

## 2024-05-21 ASSESSMENT — PAIN SCALES - GENERAL: PAINLEVEL: MILD PAIN (2)

## 2024-05-21 NOTE — PROGRESS NOTES
FOOT AND ANKLE SURGERY/PODIATRY CONSULT NOTE         ASSESSMENT:   Plantar fasciitis left foot      TREATMENT:  The patient was given a cortisone injection in the left heel today consisting of 1 cc of triamcinolone and 1 cc of 2% lidocaine plain.  I have also recommended orthotics.  I have asked the patient to return to the clinic if his pain persist at which time I would recommend a second cortisone injection.        HPI:Antony Ferguson presented to the clinic today complaining of severe pain on the bottom of the left heel.  The patient indicated that he has had this heel pain for approximately 2 months.  He describes it as a aching type pain which is aggravated with weightbearing, ambulation.  He denies any recent trauma to the foot.  He has tried stretching exercises shoe modification and anti-inflammatories.  He stated that the pain is more pronounced when he first gets out of bed in the mornings.  He has not had any associated redness or swelling.  The pain is relieved with nonweightbearing.  He denies any other previous treatment.    Past Medical History:   Diagnosis Date    Hypertension        Social History     Socioeconomic History    Marital status:      Spouse name: Not on file    Number of children: Not on file    Years of education: Not on file    Highest education level: Not on file   Occupational History    Not on file   Tobacco Use    Smoking status: Never    Smokeless tobacco: Never   Substance and Sexual Activity    Alcohol use: Yes     Comment: Alcoholic Drinks/day: Two beers per month.  Sometimes goes a few months without.    Drug use: No    Sexual activity: Yes     Partners: Female   Other Topics Concern    Not on file   Social History Narrative    Not on file     Social Determinants of Health     Financial Resource Strain: Low Risk  (10/28/2023)    Financial Resource Strain     Within the past 12 months, have you or your family members you live with been unable to get utilities (heat,  electricity) when it was really needed?: No   Food Insecurity: Low Risk  (10/28/2023)    Food Insecurity     Within the past 12 months, did you worry that your food would run out before you got money to buy more?: No     Within the past 12 months, did the food you bought just not last and you didn t have money to get more?: No   Transportation Needs: Low Risk  (10/28/2023)    Transportation Needs     Within the past 12 months, has lack of transportation kept you from medical appointments, getting your medicines, non-medical meetings or appointments, work, or from getting things that you need?: No   Physical Activity: Not on file   Stress: Not on file   Social Connections: Unknown (1/4/2022)    Received from [facility], [facility] [facility] [employer]    Social Connections     Frequency of Communication with Friends and Family: Not on file   Interpersonal Safety: Low Risk  (10/30/2023)    Interpersonal Safety     Do you feel physically and emotionally safe where you currently live?: Yes     Within the past 12 months, have you been hit, slapped, kicked or otherwise physically hurt by someone?: No     Within the past 12 months, have you been humiliated or emotionally abused in other ways by your partner or ex-partner?: No   Housing Stability: Low Risk  (10/28/2023)    Housing Stability     Do you have housing? : Yes     Are you worried about losing your housing?: No          Allergies   Allergen Reactions    Atorvastatin Unknown     Myalgias    Crestor [Rosuvastatin] Unknown     Myalgias          Current Outpatient Medications:     dexAMETHasone (DECADRON) 0.5 MG/5ML elixir, Take 5 mLs (0.5 mg) by mouth 3 times daily for 7 days Swish and spit, Disp: 105 mL, Rfl: 0    fexofenadine (ALLEGRA) 180 MG tablet, Take 180 mg by mouth as needed, Disp: , Rfl:     fluticasone-vilanterol (BREO ELLIPTA) 100-25 MCG/ACT inhaler, Inhale 1 puff into the lungs daily, Disp: , Rfl:      lisinopril (ZESTRIL) 20 MG tablet, TAKE 1 TABLET DAILY, Disp: 90 tablet, Rfl: 3    nystatin (MYCOSTATIN) [id number] UNIT/ML suspension, Swish and swallow 5 mLs (500,000 Units) in mouth 2 times daily for 7 days, Disp: 70 mL, Rfl: 0    pravastatin (PRAVACHOL) 40 MG tablet, TAKE 1 TABLET AT BEDTIME, Disp: 90 tablet, Rfl: 3    triamcinolone (NASACORT AQ) 55 mcg nasal inhaler, [TRIAMCINOLONE (NASACORT AQ) 55 MCG NASAL INHALER] 2 sprays into each nostril daily., Disp: , Rfl:      Family History   Problem Relation Age of Onset    Heart Disease Mother     Dementia Mother     Hyperlipidemia Father     Hypertension Father     No Known Problems Sister     No Known Problems Brother     No Known Problems Daughter     No Known Problems Son     Heart Disease Brother         Pacemaker    Diabetes Brother         Social History     Socioeconomic History    Marital status:      Spouse name: Not on file    Number of children: Not on file    Years of education: Not on file    Highest education level: Not on file   Occupational History    Not on file   Tobacco Use    Smoking status: Never    Smokeless tobacco: Never   Substance and Sexual Activity    Alcohol use: Yes     Comment: Alcoholic Drinks/day: Two beers per month.  Sometimes goes a few months without.    Drug use: No    Sexual activity: Yes     Partners: Female   Other Topics Concern    Not on file   Social History Narrative    Not on file     Social Determinants of Health     Financial Resource Strain: Low Risk  (10/28/2023)    Financial Resource Strain     Within the past 12 months, have you or your family members you live with been unable to get utilities (heat, electricity) when it was really needed?: No   Food Insecurity: Low Risk  (10/28/2023)    Food Insecurity     Within the past 12 months, did you worry that your food would run out before you got money to buy more?: No     Within the past 12 months, did the food you bought just not last and you didn t have money to  "get more?: No   Transportation Needs: Low Risk  (10/28/2023)    Transportation Needs     Within the past 12 months, has lack of transportation kept you from medical appointments, getting your medicines, non-medical meetings or appointments, work, or from getting things that you need?: No   Physical Activity: Not on file   Stress: Not on file   Social Connections: Unknown (1/4/2022)    Received from [facility], [facility]    Social Connections     Frequency of Communication with Friends and Family: Not on file   Interpersonal Safety: Low Risk  (10/30/2023)    Interpersonal Safety     Do you feel physically and emotionally safe where you currently live?: Yes     Within the past 12 months, have you been hit, slapped, kicked or otherwise physically hurt by someone?: No     Within the past 12 months, have you been humiliated or emotionally abused in other ways by your partner or ex-partner?: No   Housing Stability: Low Risk  (10/28/2023)    Housing Stability     Do you have housing? : Yes     Are you worried about losing your housing?: No        Review of Systems - Patient denies fever, chills, rash, wound, stiffness, limping, numbness, weakness, heart burn, blood in stool, chest pain with activity, calf pain when walking, shortness of breath with activity, chronic cough, easy bleeding/bruising, swelling of ankles, excessive thirst, fatigue, depression, anxiety.  Patient admits to left heel pain.      OBJECTIVE:  Appearance: alert, well appearing, and in no distress.    Pulse 64   Ht 1.759 m (5' 9.25\")   Wt 76.2 kg (168 lb)   SpO2 92%   BMI 24.63 kg/m       Body mass index is 24.63 kg/m .     General appearance: Patient is alert and fully cooperative with history & exam.  No sign of distress is noted during the visit.  Psychiatric: Affect is pleasant & appropriate.  Patient appears motivated to improve health.  Respiratory: Breathing is regular & " unlabored while sitting.  HEENT: Hearing is intact to spoken word.  Speech is clear.  No gross evidence of visual impairment that would impact ambulation.    Vascular: Dorsalis pedis and posterior tibial pulses are palpable. There is no pedal hair growth bilaterally.  CFT < 3 sec from anterior tibial surface to distal digits bilaterally. There is no appreciable edema noted.  Dermatologic: Turgor and texture ar left heel pain e within normal limits. No coloration or temperature changes. No primary or secondary lesions noted.  Neurologic: All epicritic and proprioceptive sensations are grossly intact bilaterally.  Musculoskeletal: All active and passive ankle, subtalar, midtarsal, and 1st MPJ range of motion are grossly intact without pain or crepitus. Manual muscle strength is within normal limits bilaterally. All dorsiflexors, plantarflexors, invertors, evertors are intact bilaterally. Tenderness present to the plantar medial aspect of the left heel on palpation.  No tenderness to the left foot or ankle with range of motion. Calf is soft/non-tender without warmth/induration    Imaging:       No images are attached to the encounter or orders placed in the encounter.     No results found.   No results found.       Jj Carrillo DPM  [facility]/Podiatry

## 2024-05-21 NOTE — Clinical Note
5/21/2024         RE: Antony Ferguson  1963 [address]  [facility] [address]        Dear Colleague,    Thank you for referring your patient, Antony Ferguson, to the [facility]. Please see a copy of my visit note below.    FOOT AND ANKLE SURGERY/PODIATRY CONSULT NOTE         ASSESSMENT:   Plantar fasciitis left foot      TREATMENT:  ***        HPI:Antony Ferguson presented to the clinic today  ***.      Past Medical History:   Diagnosis Date     Hypertension        Social History     Socioeconomic History     Marital status:      Spouse name: Not on file     Number of children: Not on file     Years of education: Not on file     Highest education level: Not on file   Occupational History     Not on file   Tobacco Use     Smoking status: Never     Smokeless tobacco: Never   Substance and Sexual Activity     Alcohol use: Yes     Comment: Alcoholic Drinks/day: Two beers per month.  Sometimes goes a few months without.     Drug use: No     Sexual activity: Yes     Partners: Female   Other Topics Concern     Not on file   Social History Narrative     Not on file     Social Determinants of Health     Financial Resource Strain: Low Risk  (10/28/2023)    Financial Resource Strain      Within the past 12 months, have you or your family members you live with been unable to get utilities (heat, electricity) when it was really needed?: No   Food Insecurity: Low Risk  (10/28/2023)    Food Insecurity      Within the past 12 months, did you worry that your food would run out before you got money to buy more?: No      Within the past 12 months, did the food you bought just not last and you didn t have money to get more?: No   Transportation Needs: Low Risk  (10/28/2023)    Transportation Needs      Within the past 12 months, has lack of transportation kept you from medical appointments, getting your medicines, non-medical meetings or appointments, work, or from getting things that you need?:  No   Physical Activity: Not on file   Stress: Not on file   Social Connections: Unknown (1/4/2022)    [facility]    Social Connections      Frequency of Communication with Friends and Family: Not on file   Interpersonal Safety: Low Risk  (10/30/2023)    Interpersonal Safety      Do you feel physically and emotionally safe where you currently live?: Yes      Within the past 12 months, have you been hit, slapped, kicked or otherwise physically hurt by someone?: No      Within the past 12 months, have you been humiliated or emotionally abused in other ways by your partner or ex-partner?: No   Housing Stability: Low Risk  (10/28/2023)    Housing Stability      Do you have housing? : Yes      Are you worried about losing your housing?: No          Allergies   Allergen Reactions     Atorvastatin Unknown     Myalgias     Crestor [Rosuvastatin] Unknown     Myalgias          Current Outpatient Medications:      dexAMETHasone (DECADRON) 0.5 MG/5ML elixir, Take 5 mLs (0.5 mg) by mouth 3 times daily for 7 days Swish and spit, Disp: 105 mL, Rfl: 0     fexofenadine (ALLEGRA) 180 MG tablet, Take 180 mg by mouth as needed, Disp: , Rfl:      fluticasone-vilanterol (BREO ELLIPTA) 100-25 MCG/ACT inhaler, Inhale 1 puff into the lungs daily, Disp: , Rfl:      lisinopril (ZESTRIL) 20 MG tablet, TAKE 1 TABLET DAILY, Disp: 90 tablet, Rfl: 3     nystatin (MYCOSTATIN) 792679 UNIT/ML suspension, Swish and swallow 5 mLs (500,000 Units) in mouth 2 times daily for 7 days, Disp: 70 mL, Rfl: 0     pravastatin (PRAVACHOL) 40 MG tablet, TAKE 1 TABLET AT BEDTIME, Disp: 90 tablet, Rfl: 3     triamcinolone (NASACORT AQ) 55 mcg nasal inhaler, [TRIAMCINOLONE (NASACORT AQ) 55 MCG NASAL INHALER] 2 sprays into each nostril daily., Disp: , Rfl:      Family History   Problem Relation Age of Onset     Heart Disease Mother      Dementia Mother      Hyperlipidemia  Father      Hypertension Father      No Known Problems Sister      No Known Problems Brother      No Known Problems Daughter      No Known Problems Son      Heart Disease Brother         Pacemaker     Diabetes Brother         Social History     Socioeconomic History     Marital status:      Spouse name: Not on file     Number of children: Not on file     Years of education: Not on file     Highest education level: Not on file   Occupational History     Not on file   Tobacco Use     Smoking status: Never     Smokeless tobacco: Never   Substance and Sexual Activity     Alcohol use: Yes     Comment: Alcoholic Drinks/day: Two beers per month.  Sometimes goes a few months without.     Drug use: No     Sexual activity: Yes     Partners: Female   Other Topics Concern     Not on file   Social History Narrative     Not on file     Social Determinants of Health     Financial Resource Strain: Low Risk  (10/28/2023)    Financial Resource Strain      Within the past 12 months, have you or your family members you live with been unable to get utilities (heat, electricity) when it was really needed?: No   Food Insecurity: Low Risk  (10/28/2023)    Food Insecurity      Within the past 12 months, did you worry that your food would run out before you got money to buy more?: No      Within the past 12 months, did the food you bought just not last and you didn t have money to get more?: No   Transportation Needs: Low Risk  (10/28/2023)    Transportation Needs      Within the past 12 months, has lack of transportation kept you from medical appointments, getting your medicines, non-medical meetings or appointments, work, or from getting things that you need?: No   Physical Activity: Not on file   Stress: Not on file   Social Connections: Unknown (1/4/2022)    [facility], [facility]    Social Connections      Frequency of Communication with Friends and  "Family: Not on file   Interpersonal Safety: Low Risk  (10/30/2023)    Interpersonal Safety      Do you feel physically and emotionally safe where you currently live?: Yes      Within the past 12 months, have you been hit, slapped, kicked or otherwise physically hurt by someone?: No      Within the past 12 months, have you been humiliated or emotionally abused in other ways by your partner or ex-partner?: No   Housing Stability: Low Risk  (10/28/2023)    Housing Stability      Do you have housing? : Yes      Are you worried about losing your housing?: No        Review of Systems - Patient denies fever, chills, rash, wound, stiffness, limping, numbness, weakness, heart burn, blood in stool, chest pain with activity, calf pain when walking, shortness of breath with activity, chronic cough, easy bleeding/bruising, swelling of ankles, excessive thirst, fatigue, depression, anxiety.  Patient admits to left heel pain.      OBJECTIVE:  Appearance: alert, well appearing, and in no distress.    Pulse 64   Ht 1.759 m (5' 9.25\")   Wt 76.2 kg (168 lb)   SpO2 92%   BMI 24.63 kg/m       Body mass index is 24.63 kg/m .     General appearance: Patient is alert and fully cooperative with history & exam.  No sign of distress is noted during the visit.  Psychiatric: Affect is pleasant & appropriate.  Patient appears motivated to improve health.  Respiratory: Breathing is regular & unlabored while sitting.  HEENT: Hearing is intact to spoken word.  Speech is clear.  No gross evidence of visual impairment that would impact ambulation.    Vascular: Dorsalis pedis and posterior tibial pulses are palpable. There is pedal hair growth ***.  CFT < 3 sec from anterior tibial surface to distal digits ***. There is no appreciable edema noted.  Dermatologic: Turgor and texture ar left heel pain e within normal limits. No coloration or temperature changes. No primary or secondary lesions noted.  Neurologic: All epicritic and proprioceptive " sensations are grossly intact ***.  Musculoskeletal: All active and passive ankle, subtalar, midtarsal, and 1st MPJ range of motion are grossly intact without pain or crepitus. Manual muscle strength is within normal limits bilaterally. All dorsiflexors, plantarflexors, invertors, evertors are intact bilaterally. Tenderness present to the plantar medial aspect of the left heel on palpation.  No tenderness to the left foot or ankle with range of motion. Calf is soft/non-tender without warmth/induration    Imaging:       No images are attached to the encounter or orders placed in the encounter.     No results found.   No results found.       Jj Carrillo DPM  [facility] Foot & Ankle Surgery/Podiatry         Again, thank you for allowing me to participate in the care of your patient.        Sincerely,        Jj Dickinson DPM

## 2024-05-21 NOTE — PATIENT INSTRUCTIONS
What are Prescription Custom Orthotics?  Custom orthotics are specially-made devices designed to support and comfort your feet. Prescription orthotics are crafted for you and no one else. They match the contours of your feet precisely and are designed for the way you move. Orthotics are only manufactured after a podiatrist has conducted a complete evaluation of your feet, ankles, and legs, so the orthotic can accommodate your unique foot structure and pathology.  Prescription orthotics are divided into two categories:  Functional orthotics are designed to control abnormal motion. They may be used to treat foot pain caused by abnormal motion; they can also be used to treat injuries such as shin splints or tendinitis. Functional orthotics are usually crafted of a semi-rigid material such as plastic or graphite.  Accommodative orthotics are softer and meant to provide additional cushioning and support. They can be used to treat diabetic foot ulcers, painful calluses on the bottom of the foot, and other uncomfortable conditions.  Podiatrists use orthotics to treat foot problems such as plantar fasciitis, bursitis, tendinitis, diabetic foot ulcers, and foot, ankle, and heel pain. Clinical research studies have shown that podiatrist-prescribed foot orthotics decrease foot pain and improve function.  Orthotics typically cost more than shoe inserts purchased in a retail store, but the additional cost is usually well worth it. Unlike shoe inserts, orthotics are molded to fit each individual foot, so you can be sure that your orthotics fit and do what they're supposed to do. Prescription orthotics are also made of top-notch materials and last many years when cared for properly. Insurance often helps pay for prescription orthotics.  What are Shoe Inserts?   You've seen them at the grocery store and at the mall. You've probably even seen them on TV and online. Shoe inserts are any kind of non-prescription foot support designed  to be worn inside a shoe. Pre-packaged, mass produced, arch supports are shoe inserts. So are the  custom-made  insoles and foot supports that you can order online or at retail stores. Unless the device has been prescribed by a doctor and crafted for your specific foot, it's a shoe insert, not a custom orthotic device--despite what the ads might say.  Shoe inserts can be very helpful for a variety of foot ailments, including flat arches and foot and leg pain. They can cushion your feet, provide comfort, and support your arches, but they can't correct biomechanical foot problems or cure long-standing foot issues.  The most common types of shoe inserts are:  Arch supports: Some people have high arches. Others have low arches or flat feet. Arch supports generally have a  bumped-up  appearance and are designed to support the foot's natural arch.   Insoles: Insoles slip into your shoe to provide extra cushioning and support. Insoles are often made of gel, foam, or plastic.   Heel liners: Heel liners, sometimes called heel pads or heel cups, provide extra cushioning in the heel region. They may be especially useful for patients who have foot pain caused by age-related thinning of the heels' natural fat pads.   Foot cushions: Do your shoes rub against your heel or your toes? Foot cushions come in many different shapes and sizes and can be used as a barrier between you and your shoe.  Choosing an Over-the-Counter Shoe Insert  Selecting a shoe insert from the wide variety of devices on the market can be overwhelming. Here are some podiatrist-tested tips to help you find the insert that best meets your needs:  Consider your health. Do you have diabetes? Problems with circulation? An over-the-counter insert may not be your best bet. Diabetes and poor circulation increase your risk of foot ulcers and infections, so schedule an appointment with a podiatrist. He or she can help you select a solution that won't cause additional  health problems.   Think about the purpose. Are you planning to run a marathon, or do you just need a little arch support in your work shoes? Look for a product that fits your planned level of activity.   Bring your shoes. For the insert to be effective, it has to fit into your shoes. So bring your sneakers, dress shoes, or work boots--whatever you plan to wear with your insert. Look for an insert that will fit the contours of your shoe.   Try them on. If all possible, slip the insert into your shoe and try it out. Walk around a little. How does it feel? Don't assume that feelings of pressure will go away with continued wear. (If you can't try the inserts at the store, ask about the store's return policy and hold on to your receipt.)    Please call one of the [place] locations below to schedule an appointment. If you received a prescription please bring it with you to your appointment. Some locations are limited to what they carry.    Office Locations    [facility]  [address] [place] [address]  Home Medical Equipment, [address]   Phone: [phone]   Orthotics and Prosthetics, [address]   Phone: [phone]    [facility]   Home Medical Equipment   [id number], [place] [address]  Phone :[phone]  Orthotics and Prosthetics   [address], [address], [facility] [address]  Phone:[phone]          [facility]  [address]  [address]   [place] [address]   Phone: [phone]    [facility]ld[facility]  [address]  [place] [address]  Phone: [phone]    [facility]  [address]  [place] [address]  Phone: [phone]  Fax: [phone]    [facility]   [address]  [place] [address]  Phone:  [phone]    [facility]  [address]  [place] [address]  Phone: [phone]    [facility]  [address]  [place] [address]  Phone: [phone]    [place]   [address] [place] [address]   Phone: [phone]    WEARING YOUR CUSTOM FOOT ORTHOTICS   Most insurance plans cover one pair of orthotics per year. You must check with your   insurance plan to see what your payment responsibility will be. Please call your   insurance company by calling the number on the back of your insurance card.   Orthotic's are non-refundable and non-returnable.   Orthotics are made of various designs. Some orthotics are covered with material that extends beyond your toes. If your orthotic is of this design, you will likely need to trim the toe end to get a proper fit. The insole from your shoe can be used as a template. Simply overlay the shoe insert on top of the custom orthotic. Align the heel end while tracing the length of the insert onto the custom orthotic. Use a large scissor to trim the toe end until you get a proper fit in the shoe.   The orthotic needs to be pushed as far back in the shoe as possible. The heel portion should not ride forward so as not to irritate your heel.   Orthotics are designed to work with socks. Excessive perspiration will shorten the life span of the orthotics. Remove the orthotic from the shoe frequently for proper drying.   The break-in period lasts for weeks. People new to orthotics will likely experience new aches and pains. The orthotic is forcing your foot into a new position. Arch, foot and leg muscle aches and fatigue are common during these weeks. Minor discomfort can be considered normal break in phenomenon. Start wearing your orthotic around your home your first day. Limited activity for one to two hours is recommended. You can increase one or two additional hours each  day provided the aches and pains are subsiding. The degree of discomfort, fatigue and problems will dictate the speed of break in. You may require multiple weeks to work up to full time use.   Do not continue wearing your orthotics if they are creating problems such as blisters or sores. Do not hesitate to call the clinic to speak with a nurse regarding orthotic   break in, fit, trimming, etc. You may also need to see the doctor if the orthotics are   simply not working out. Adjustments are sometimes made to improve orthotic   function.     Orthotics will only work in certain styles and types of shoes. Orthotics rarely work in dress shoes. Slip-ons, clogs, sandals and heels are particularly troublesome. Specially designed orthotics may be necessary for these types of shoes. Your custom orthotic was designed for activities that require appropriate walking or running shoes. Lace up athletic shoes, walking shoes or work boots should work appropriately. You may need a wider or longer shoe. Shoes with a removable  or insert work best. In general, you want to remove an insert from the shoe before placing the orthotic into the shoe. Shoes without a removable liner may not work as well.     When purchasing new shoes, bring your orthotics along to get a proper fit. Shop at stores that are familiar with orthotics.   Frequent washing of the orthotic may shorten the life span of the top cover. The top cover can be replaced but will generally last one to five years depending on use and foot perspiration.

## 2024-05-30 ENCOUNTER — OFFICE VISIT (OUTPATIENT)
Dept: PODIATRY | Facility: CLINIC | Age: 69
End: 2024-05-30
Payer: COMMERCIAL

## 2024-05-30 VITALS — WEIGHT: 168 LBS | BODY MASS INDEX: 24.88 KG/M2 | OXYGEN SATURATION: 95 % | HEIGHT: 69 IN | HEART RATE: 72 BPM

## 2024-05-30 DIAGNOSIS — M72.2 PLANTAR FASCIITIS: Primary | ICD-10-CM

## 2024-05-30 PROCEDURE — 99213 OFFICE O/P EST LOW 20 MIN: CPT | Mod: 25 | Performed by: PODIATRIST

## 2024-05-30 PROCEDURE — 20550 NJX 1 TENDON SHEATH/LIGAMENT: CPT | Mod: LT | Performed by: PODIATRIST

## 2024-05-30 RX ORDER — TRIAMCINOLONE ACETONIDE 40 MG/ML
40 INJECTION, SUSPENSION INTRA-ARTICULAR; INTRAMUSCULAR ONCE
Status: COMPLETED | OUTPATIENT
Start: 2024-05-30 | End: 2024-05-30

## 2024-05-30 RX ORDER — LIDOCAINE HYDROCHLORIDE 20 MG/ML
1 INJECTION, SOLUTION INFILTRATION; PERINEURAL ONCE
Status: COMPLETED | OUTPATIENT
Start: 2024-05-30 | End: 2024-05-30

## 2024-05-30 RX ADMIN — TRIAMCINOLONE ACETONIDE 40 MG: 40 INJECTION, SUSPENSION INTRA-ARTICULAR; INTRAMUSCULAR at 15:25

## 2024-05-30 RX ADMIN — LIDOCAINE HYDROCHLORIDE 1 ML: 20 INJECTION, SOLUTION INFILTRATION; PERINEURAL at 15:25

## 2024-05-30 ASSESSMENT — PAIN SCALES - GENERAL: PAINLEVEL: MODERATE PAIN (4)

## 2024-05-30 NOTE — PROGRESS NOTES
FOOT AND ANKLE SURGERY/PODIATRY Progress Note        ASSESSMENT:   Plantar fasciitis left foot        TREATMENT:  The patient was given a second cortisone injection in the left heel today consisting of 1 cc of triamcinolone and 1 cc of 2% lidocaine plain.  I have asked the patient to return to the clinic if his pain persist at which time I would recommend an x-ray of the left foot.         HPI:Antony Ferguson presented to the clinic today with continued complaints of left heel pain.   The patient has had this heel pain for approximately 2 months.  He describes it as a aching type pain which is aggravated with weightbearing, ambulation.  He denies any recent trauma to the foot.  He has tried stretching exercises shoe modification and anti-inflammatories.  He stated that the pain is more pronounced when he first gets out of bed in the mornings.  He has not had any associated redness or swelling.  The pain is relieved with nonweightbearing.  He was given a cortisone injection during his last visit and he stated he had only temporary relief.     Past Medical History:   Diagnosis Date    Hypertension         Past Surgical History:   Procedure Laterality Date    ME BIOPSY/EXCISION, LYMPH NODE(S)      Description: Biopsy Lymph Node;  Recorded: 09/02/2014;       Allergies   Allergen Reactions    Atorvastatin Unknown     Myalgias    Crestor [Rosuvastatin] Unknown     Myalgias         Current Outpatient Medications:     dexAMETHasone (DECADRON) 0.5 MG/5ML elixir, Take 5 mLs (0.5 mg) by mouth 3 times daily for 7 days Swish and spit, Disp: 105 mL, Rfl: 0    fexofenadine (ALLEGRA) 180 MG tablet, Take 180 mg by mouth as needed, Disp: , Rfl:     fluticasone-vilanterol (BREO ELLIPTA) 100-25 MCG/ACT inhaler, Inhale 1 puff into the lungs daily, Disp: , Rfl:     lisinopril (ZESTRIL) 20 MG tablet, TAKE 1 TABLET DAILY, Disp: 90 tablet, Rfl: 3    pravastatin (PRAVACHOL) 40 MG tablet, TAKE 1 TABLET AT BEDTIME, Disp: 90 tablet, Rfl: 3     triamcinolone (NASACORT AQ) 55 mcg nasal inhaler, [TRIAMCINOLONE (NASACORT AQ) 55 MCG NASAL INHALER] 2 sprays into each nostril daily., Disp: , Rfl:     Family History   Problem Relation Age of Onset    Heart Disease Mother     Dementia Mother     Hyperlipidemia Father     Hypertension Father     No Known Problems Sister     No Known Problems Brother     No Known Problems Daughter     No Known Problems Son     Heart Disease Brother         Pacemaker    Diabetes Brother        Social History     Socioeconomic History    Marital status:      Spouse name: Not on file    Number of children: Not on file    Years of education: Not on file    Highest education level: Not on file   Occupational History    Not on file   Tobacco Use    Smoking status: Never    Smokeless tobacco: Never   Substance and Sexual Activity    Alcohol use: Yes     Comment: Alcoholic Drinks/day: Two beers per month.  Sometimes goes a few months without.    Drug use: No    Sexual activity: Yes     Partners: Female   Other Topics Concern    Not on file   Social History Narrative    Not on file     Social Determinants of Health     Financial Resource Strain: Low Risk  (10/28/2023)    Financial Resource Strain     Within the past 12 months, have you or your family members you live with been unable to get utilities (heat, electricity) when it was really needed?: No   Food Insecurity: Low Risk  (10/28/2023)    Food Insecurity     Within the past 12 months, did you worry that your food would run out before you got money to buy more?: No     Within the past 12 months, did the food you bought just not last and you didn t have money to get more?: No   Transportation Needs: Low Risk  (10/28/2023)    Transportation Needs     Within the past 12 months, has lack of transportation kept you from medical appointments, getting your medicines, non-medical meetings or appointments, work, or from getting things that you need?: No   Physical Activity: Not on file    Stress: Not on file   Social Connections: Unknown (1/4/2022)    Received from [facility], [facility]    Social Connections     Frequency of Communication with Friends and Family: Not on file   Interpersonal Safety: Low Risk  (10/30/2023)    Interpersonal Safety     Do you feel physically and emotionally safe where you currently live?: Yes     Within the past 12 months, have you been hit, slapped, kicked or otherwise physically hurt by someone?: No     Within the past 12 months, have you been humiliated or emotionally abused in other ways by your partner or ex-partner?: No   Housing Stability: Low Risk  (10/28/2023)    Housing Stability     Do you have housing? : Yes     Are you worried about losing your housing?: No       10 point Review of Systems is negative      There were no vitals taken for this visit.    BMI= There is no height or weight on file to calculate BMI.    OBJECTIVE:  General appearance: Patient is alert and fully cooperative with history & exam.  No sign of distress is noted during the visit.  Vascular: Dorsalis pedis and posterior tibial pulses are palpable. There is no pedal hair growth bilaterally.  CFT < 3 sec from anterior tibial surface to distal digits bilaterally. There is no appreciable edema noted.  Dermatologic: Turgor and texture ar left heel pain e within normal limits. No coloration or temperature changes. No primary or secondary lesions noted.  Neurologic: All epicritic and proprioceptive sensations are grossly intact bilaterally.  Musculoskeletal: All active and passive ankle, subtalar, midtarsal, and 1st MPJ range of motion are grossly intact without pain or crepitus. Manual muscle strength is within normal limits bilaterally. All dorsiflexors, plantarflexors, invertors, evertors are intact bilaterally. Tenderness present to the plantar medial aspect of the left heel on palpation.  No tenderness to the left foot  or ankle with range of motion. Calf is soft/non-tender without warmth/induration    Imaging:         No results found.           Jj Dickinson; STEPHANIE  [facility]

## 2024-05-30 NOTE — Clinical Note
5/30/2024         RE: Antony Ferguson  [address]  [facility] [address]        Dear Colleague,    Thank you for referring your patient, Antony Ferguson, to the [facility]. Please see a copy of my visit note below.    FOOT AND ANKLE SURGERY/PODIATRY Progress Note        ASSESSMENT:   Plantar fasciitis left foot        TREATMENT:  The patient was given a second cortisone injection in the left heel today consisting of 1 cc of triamcinolone and 1 cc of 2% lidocaine plain.  I have asked the patient to return to the clinic if his pain persist at which time I would recommend an x-ray of the left foot.         HPI:Antony Ferguson presented to the clinic today with continued complaints of left heel pain.   The patient has had this heel pain for approximately 2 months.  He describes it as a aching type pain which is aggravated with weightbearing, ambulation.  He denies any recent trauma to the foot.  He has tried stretching exercises shoe modification and anti-inflammatories.  He stated that the pain is more pronounced when he first gets out of bed in the mornings.  He has not had any associated redness or swelling.  The pain is relieved with nonweightbearing.  He was given a cortisone injection during his last visit and he stated he had only temporary relief.     Past Medical History:   Diagnosis Date     Hypertension         Past Surgical History:   Procedure Laterality Date     VT BIOPSY/EXCISION, LYMPH NODE(S)      Description: Biopsy Lymph Node;  Recorded: 09/02/2014;       Allergies   Allergen Reactions     Atorvastatin Unknown     Myalgias     Crestor [Rosuvastatin] Unknown     Myalgias         Current Outpatient Medications:      dexAMETHasone (DECADRON) 0.5 MG/5ML elixir, Take 5 mLs (0.5 mg) by mouth 3 times daily for 7 days Swish and spit, Disp: 105 mL, Rfl: 0     fexofenadine (ALLEGRA) 180 MG tablet, Take 180 mg by mouth as needed, Disp: , Rfl:      fluticasone-vilanterol (BREO  ELLIPTA) 100-25 MCG/ACT inhaler, Inhale 1 puff into the lungs daily, Disp: , Rfl:      lisinopril (ZESTRIL) 20 MG tablet, TAKE 1 TABLET DAILY, Disp: 90 tablet, Rfl: 3     pravastatin (PRAVACHOL) 40 MG tablet, TAKE 1 TABLET AT BEDTIME, Disp: 90 tablet, Rfl: 3     triamcinolone (NASACORT AQ) 55 mcg nasal inhaler, [TRIAMCINOLONE (NASACORT AQ) 55 MCG NASAL INHALER] 2 sprays into each nostril daily., Disp: , Rfl:     Family History   Problem Relation Age of Onset     Heart Disease Mother      Dementia Mother      Hyperlipidemia Father      Hypertension Father      No Known Problems Sister      No Known Problems Brother      No Known Problems Daughter      No Known Problems Son      Heart Disease Brother         Pacemaker     Diabetes Brother        Social History     Socioeconomic History     Marital status:      Spouse name: Not on file     Number of children: Not on file     Years of education: Not on file     Highest education level: Not on file   Occupational History     Not on file   Tobacco Use     Smoking status: Never     Smokeless tobacco: Never   Substance and Sexual Activity     Alcohol use: Yes     Comment: Alcoholic Drinks/day: Two beers per month.  Sometimes goes a few months without.     Drug use: No     Sexual activity: Yes     Partners: Female   Other Topics Concern     Not on file   Social History Narrative     Not on file     Social Determinants of Health     Financial Resource Strain: Low Risk  (10/28/2023)    Financial Resource Strain      Within the past 12 months, have you or your family members you live with been unable to get utilities (heat, electricity) when it was really needed?: No   Food Insecurity: Low Risk  (10/28/2023)    Food Insecurity      Within the past 12 months, did you worry that your food would run out before you got money to buy more?: No      Within the past 12 months, did the food you bought just not last and you didn t have money to get more?: No   Transportation  Needs: Low Risk  (10/28/2023)    Transportation Needs      Within the past 12 months, has lack of transportation kept you from medical appointments, getting your medicines, non-medical meetings or appointments, work, or from getting things that you need?: No   Physical Activity: Not on file   Stress: Not on file   Social Connections: Unknown (1/4/2022)    Received from [facility], [facility]    Social Connections      Frequency of Communication with Friends and Family: Not on file   Interpersonal Safety: Low Risk  (10/30/2023)    Interpersonal Safety      Do you feel physically and emotionally safe where you currently live?: Yes      Within the past 12 months, have you been hit, slapped, kicked or otherwise physically hurt by someone?: No      Within the past 12 months, have you been humiliated or emotionally abused in other ways by your partner or ex-partner?: No   Housing Stability: Low Risk  (10/28/2023)    Housing Stability      Do you have housing? : Yes      Are you worried about losing your housing?: No       10 point Review of Systems is negative      There were no vitals taken for this visit.    BMI= There is no height or weight on file to calculate BMI.    OBJECTIVE:  General appearance: Patient is alert and fully cooperative with history & exam.  No sign of distress is noted during the visit.  Vascular: Dorsalis pedis and posterior tibial pulses are palpable. There is no pedal hair growth bilaterally.  CFT < 3 sec from anterior tibial surface to distal digits bilaterally. There is no appreciable edema noted.  Dermatologic: Turgor and texture ar left heel pain e within normal limits. No coloration or temperature changes. No primary or secondary lesions noted.  Neurologic: All epicritic and proprioceptive sensations are grossly intact bilaterally.  Musculoskeletal: All active and passive ankle, subtalar, midtarsal, and 1st MPJ range of  motion are grossly intact without pain or crepitus. Manual muscle strength is within normal limits bilaterally. All dorsiflexors, plantarflexors, invertors, evertors are intact bilaterally. Tenderness present to the plantar medial aspect of the left heel on palpation.  No tenderness to the left foot or ankle with range of motion. Calf is soft/non-tender without warmth/induration    Imaging:         No results found.           Jj Dickinson; STEPHANIE  [facility] Foot & Ankle Surgery/Podiatry         Again, thank you for allowing me to participate in the care of your patient.        Sincerely,        Jj Dickinson DPM

## 2024-07-08 DIAGNOSIS — I10 HYPERTENSION: ICD-10-CM

## 2024-07-08 DIAGNOSIS — E78.5 HYPERLIPIDEMIA: ICD-10-CM

## 2024-07-08 RX ORDER — LISINOPRIL 20 MG/1
20 TABLET ORAL DAILY
Qty: 90 TABLET | Refills: 0 | Status: SHIPPED | OUTPATIENT
Start: 2024-07-08 | End: 2024-09-19

## 2024-07-08 RX ORDER — PRAVASTATIN SODIUM 40 MG
40 TABLET ORAL AT BEDTIME
Qty: 90 TABLET | Refills: 0 | Status: SHIPPED | OUTPATIENT
Start: 2024-07-08 | End: 2024-07-09

## 2024-07-08 NOTE — TELEPHONE ENCOUNTER
Medication Question or Refill    Contacts       Contact Date/Time Type Contact Phone/Fax    07/08/2024 08:52 AM CDT Phone (Incoming) Dorothy Ferguson (Self) [phone] (M)     Medication refill requested - PCP left and is needing a refill to last until next follow-up in Nov            What medication are you calling about (include dose and sig)?: pravastatin (PRAVACHOL) 40 MG tablet  lisinopril (ZESTRIL) 20 MG tablet    Preferred Pharmacy:     [facility] - [address] [address]  Phone: [phone] Fax: [phone]      Controlled Substance Agreement on file:   CSA -- Patient Level:    CSA: None found at the patient level.       Who prescribed the medication?: Randy Moreno MD     Do you need a refill? Yes    When did you use the medication last? 7/5/24    Patient offered an appointment? Yes: Wanted to hear back from care team first and then schedule a follow-up at a later time    Do you have any questions or concerns?  No      Could we send this information to you in [facility] or would you prefer to receive a phone call?:   Patient would prefer a phone call   Okay to leave a detailed message?: Yes at Cell number on file:    Telephone Information:   Mobile [phone]

## 2024-07-08 NOTE — TELEPHONE ENCOUNTER
I will fill his medication for 90 days but they do need to establish care with an open provider before further refills, thank you.  Vicenta Wilde MD

## 2024-07-09 ENCOUNTER — TELEPHONE (OUTPATIENT)
Dept: FAMILY MEDICINE | Facility: CLINIC | Age: 69
End: 2024-07-09
Payer: COMMERCIAL

## 2024-07-09 DIAGNOSIS — E78.5 DYSLIPIDEMIA: Primary | ICD-10-CM

## 2024-07-09 DIAGNOSIS — E78.5 HYPERLIPIDEMIA: ICD-10-CM

## 2024-07-09 RX ORDER — PRAVASTATIN SODIUM 40 MG
40 TABLET ORAL AT BEDTIME
Qty: 90 TABLET | Refills: 3 | Status: SHIPPED | OUTPATIENT
Start: 2024-07-09

## 2024-07-09 NOTE — TELEPHONE ENCOUNTER
Medication Question or Refill    Contacts       Contact Date/Time Type Contact Phone/Fax    07/09/2024 12:37 PM CDT Phone (Incoming) [facility] - [address] (Pharmacy) [phone]            What medication are you calling about (include dose and sig)?:   pravastatin (PRAVACHOL) 40 MG tablet     Preferred Pharmacy:       [facility] - [address] [address]  Phone: [phone] Fax: [phone]      Controlled Substance Agreement on file:   CSA -- Patient Level:    CSA: None found at the patient level.       Who prescribed the medication?: Vicenta Wilde    Do you have any questions or concerns?  Yes: pharmacy was flagged that pt has an allergy to rosuvastatin. OK to go ahead and dispense pravastatin or would prescriber like to change prescription?    Call Back [facility]: [phone] ref # [id number]

## 2024-09-18 DIAGNOSIS — I10 HYPERTENSION: ICD-10-CM

## 2024-09-19 RX ORDER — LISINOPRIL 20 MG/1
20 TABLET ORAL DAILY
Qty: 90 TABLET | Refills: 0 | Status: SHIPPED | OUTPATIENT
Start: 2024-09-19

## 2024-10-13 ENCOUNTER — OFFICE VISIT (OUTPATIENT)
Dept: FAMILY MEDICINE | Facility: CLINIC | Age: 69
End: 2024-10-13
Payer: COMMERCIAL

## 2024-10-13 VITALS
DIASTOLIC BLOOD PRESSURE: 89 MMHG | HEART RATE: 67 BPM | WEIGHT: 169.7 LBS | OXYGEN SATURATION: 97 % | SYSTOLIC BLOOD PRESSURE: 186 MMHG | RESPIRATION RATE: 16 BRPM | BODY MASS INDEX: 24.88 KG/M2 | TEMPERATURE: 98.2 F

## 2024-10-13 DIAGNOSIS — K12.0 APHTHOUS STOMATITIS: Primary | ICD-10-CM

## 2024-10-13 PROCEDURE — 99213 OFFICE O/P EST LOW 20 MIN: CPT | Performed by: FAMILY MEDICINE

## 2024-10-13 RX ORDER — NYSTATIN 100000 [USP'U]/ML
500000 SUSPENSION ORAL 2 TIMES DAILY
Qty: 70 ML | Refills: 0 | Status: SHIPPED | OUTPATIENT
Start: 2024-10-13

## 2024-10-13 RX ORDER — DEXAMETHASONE 0.5 MG/5ML
0.5 ELIXIR ORAL 3 TIMES DAILY
Qty: 105 ML | Refills: 0 | Status: SHIPPED | OUTPATIENT
Start: 2024-10-13

## 2024-10-13 NOTE — PROGRESS NOTES
(K12.0) Aphthous stomatitis  (primary encounter diagnosis)  Comment:     Patient seems to have improved with this management previously so it is restarted.    Plan: dexAMETHasone (DECADRON) 0.5 MG/5ML elixir,         nystatin (MYCOSTATIN) 868217 UNIT/ML suspension  He is to have follow-up if not improving over the next week or so.            CHIEF COMPLAINT    Both ulcerations.      HISTORY    This 69-year-old man has redeveloped aphthous ulcers.    He was treated in May for this condition also and seem to respond to treatment.    He has an inhaler which contains steroid but uses it relatively infrequently.    He does not feel his body has been otherwise stressed by any other factor at the present time.      REVIEW OF SYSTEMS    No fever.  No upper respiratory congestion.  No cough or SOB.  No chest or abdominal pain.      EXAM  BP (!) 186/89   Pulse 67   Temp 98.2  F (36.8  C) (Oral)   Resp 16   Wt 77 kg (169 lb 11.2 oz)   SpO2 97%   BMI 24.88 kg/m      Multiple small ulcerations are present on the soft palate and some on buccal mucosa.  There is not generalized swelling or redness present.  Tongue appears WNL.

## 2024-12-04 ENCOUNTER — OFFICE VISIT (OUTPATIENT)
Dept: URGENT CARE | Facility: URGENT CARE | Age: 69
End: 2024-12-04
Payer: COMMERCIAL

## 2024-12-04 VITALS
DIASTOLIC BLOOD PRESSURE: 95 MMHG | WEIGHT: 167.6 LBS | BODY MASS INDEX: 24.57 KG/M2 | TEMPERATURE: 98.4 F | OXYGEN SATURATION: 98 % | SYSTOLIC BLOOD PRESSURE: 187 MMHG | HEART RATE: 63 BPM | RESPIRATION RATE: 16 BRPM

## 2024-12-04 DIAGNOSIS — B37.0 ORAL THRUSH: Primary | ICD-10-CM

## 2024-12-04 DIAGNOSIS — K12.0 APHTHOUS STOMATITIS: ICD-10-CM

## 2024-12-04 PROCEDURE — 99213 OFFICE O/P EST LOW 20 MIN: CPT | Performed by: PHYSICIAN ASSISTANT

## 2024-12-04 RX ORDER — DEXAMETHASONE 0.5 MG/5ML
0.5 ELIXIR ORAL 3 TIMES DAILY
Qty: 105 ML | Refills: 0 | Status: SHIPPED | OUTPATIENT
Start: 2024-12-04

## 2024-12-04 RX ORDER — NYSTATIN 100000 [USP'U]/ML
500000 SUSPENSION ORAL 2 TIMES DAILY
Qty: 70 ML | Refills: 0 | Status: SHIPPED | OUTPATIENT
Start: 2024-12-04

## 2024-12-04 NOTE — PATIENT INSTRUCTIONS
Rinse your mouth out after using the oral steroid inhaler    Use the 2 medications the nystatin and dexamethasone to help with the thrush on the tongue    Return to see primary care provider if not getting good resolution or if new symptoms or concerns arise

## 2024-12-04 NOTE — PROGRESS NOTES
Patient presents with:  Oral Swelling: Swollen tongue with mouth sores, left side of neck. Seen in UC for the same symptoms in October, treated with Dexamethasone & Nystatin. Tylenol taken with relief. Denies fever. Hx of similar episode in May. Each episode patient reports follows after cold symptoms. Patient reports no taste.       Clinical Decision Making:      ICD-10-CM    1. Oral thrush  B37.0 nystatin (MYCOSTATIN) 513292 UNIT/ML suspension     dexAMETHasone (DECADRON) 0.5 MG/5ML elixir      2. Aphthous stomatitis  K12.0           Patient Instructions   Rinse your mouth out after using the oral steroid inhaler    Use the 2 medications the nystatin and dexamethasone to help with the thrush on the tongue    Return to see primary care provider if not getting good resolution or if new symptoms or concerns arise    HPI:  Antony Ferguson is a 69 year old male who presents today complaining of ***    History obtained from {source of history:612763}.    Problem List:  2016-09: Accelerated hypertension  2016-02: Exposure - potential exposure to hepatitis A, hepatitis B,   typhoid, and traveler's diarrhea and upcoming trip to [facility].  2015-10: Webbed toes of both feet - harmless and likely to be passed   on to your children 50% of the time.  2015-06: Sarcoidosis - found on lymph node biopsy  Dyslipidemia  Allergic Rhinitis Due To Pollen  Benign Essential Hypertension  Asthma, mild persistent      Past Medical History:   Diagnosis Date    Hypertension        Social History     Tobacco Use    Smoking status: Never     Passive exposure: Never    Smokeless tobacco: Never   Substance Use Topics    Alcohol use: Yes     Comment: Alcoholic Drinks/day: Two beers per month.  Sometimes goes a few months without.       Review of Systems  As above in HPI otherwise negative.    Vitals:    12/04/24 1123   BP: (!) 187/95   BP Location: Right arm   Patient Position: Sitting   Cuff Size: Adult Regular   Pulse: 63   Resp: 16   Temp:  98.4  F (36.9  C)   TempSrc: Oral   SpO2: 98%   Weight: 76 kg (167 lb 9.6 oz)       General: Patient is resting comfortably no acute distress is afebrile  HEENT: Head is normocephalic atraumatic   eyes are PERRL EOMI sclera anicteric   TMs are clear bilaterally  Throat is with mild pharyngeal wall erythema and no exudate  No cervical lymphadenopathy present  LUNGS: Clear to auscultation bilaterally  HEART: Regular rate and rhythm  Skin: Without rash non-diaphoretic    Physical Exam      Labs:  No results found for any visits on 12/04/24.    Radiology:  I have personally ordered and preliminarily reviewed the following xray, I have noted ***    No results found.     At the end of the encounter, I discussed results, diagnosis, medications. Discussed red flags for immediate return to clinic/ER, as well as indications for follow up if no improvement. Patient understood and agreed to plan. Patient was stable for discharge.

## 2024-12-18 DIAGNOSIS — I10 HYPERTENSION: ICD-10-CM

## 2024-12-19 RX ORDER — LISINOPRIL 20 MG/1
20 TABLET ORAL DAILY
Qty: 30 TABLET | Refills: 0 | Status: SHIPPED | OUTPATIENT
Start: 2024-12-19

## 2025-01-06 ENCOUNTER — TRANSFERRED RECORDS (OUTPATIENT)
Dept: HEALTH INFORMATION MANAGEMENT | Facility: CLINIC | Age: 70
End: 2025-01-06
Payer: COMMERCIAL

## 2025-01-13 SDOH — HEALTH STABILITY: PHYSICAL HEALTH: ON AVERAGE, HOW MANY MINUTES DO YOU ENGAGE IN EXERCISE AT THIS LEVEL?: 100 MIN

## 2025-01-13 SDOH — HEALTH STABILITY: PHYSICAL HEALTH: ON AVERAGE, HOW MANY DAYS PER WEEK DO YOU ENGAGE IN MODERATE TO STRENUOUS EXERCISE (LIKE A BRISK WALK)?: 3 DAYS

## 2025-01-13 ASSESSMENT — SOCIAL DETERMINANTS OF HEALTH (SDOH): HOW OFTEN DO YOU GET TOGETHER WITH FRIENDS OR RELATIVES?: ONCE A WEEK

## 2025-01-16 ENCOUNTER — TRANSFERRED RECORDS (OUTPATIENT)
Dept: HEALTH INFORMATION MANAGEMENT | Facility: CLINIC | Age: 70
End: 2025-01-16

## 2025-01-16 ENCOUNTER — OFFICE VISIT (OUTPATIENT)
Dept: FAMILY MEDICINE | Facility: CLINIC | Age: 70
End: 2025-01-16
Payer: COMMERCIAL

## 2025-01-16 VITALS
HEIGHT: 69 IN | TEMPERATURE: 97.7 F | SYSTOLIC BLOOD PRESSURE: 151 MMHG | BODY MASS INDEX: 24.59 KG/M2 | DIASTOLIC BLOOD PRESSURE: 77 MMHG | HEART RATE: 66 BPM | RESPIRATION RATE: 16 BRPM | WEIGHT: 166 LBS | OXYGEN SATURATION: 98 %

## 2025-01-16 DIAGNOSIS — E78.2 MIXED HYPERLIPIDEMIA: ICD-10-CM

## 2025-01-16 DIAGNOSIS — I10 ESSENTIAL HYPERTENSION, BENIGN: ICD-10-CM

## 2025-01-16 DIAGNOSIS — Z00.00 ROUTINE GENERAL MEDICAL EXAMINATION AT A HEALTH CARE FACILITY: Primary | ICD-10-CM

## 2025-01-16 DIAGNOSIS — J45.20 MILD INTERMITTENT ASTHMA WITHOUT COMPLICATION: ICD-10-CM

## 2025-01-16 DIAGNOSIS — Z12.5 SCREENING FOR PROSTATE CANCER: ICD-10-CM

## 2025-01-16 DIAGNOSIS — I10 WHITE COAT SYNDROME WITH DIAGNOSIS OF HYPERTENSION: ICD-10-CM

## 2025-01-16 DIAGNOSIS — R73.03 PREDIABETES: ICD-10-CM

## 2025-01-16 LAB
ALBUMIN SERPL BCG-MCNC: 4.8 G/DL (ref 3.5–5.2)
ALP SERPL-CCNC: 83 U/L (ref 40–150)
ALT SERPL W P-5'-P-CCNC: 31 U/L (ref 0–70)
ANION GAP SERPL CALCULATED.3IONS-SCNC: 12 MMOL/L (ref 7–15)
AST SERPL W P-5'-P-CCNC: 25 U/L (ref 0–45)
BILIRUB SERPL-MCNC: 0.7 MG/DL
BUN SERPL-MCNC: 27.7 MG/DL (ref 8–23)
CALCIUM SERPL-MCNC: 10.3 MG/DL (ref 8.8–10.4)
CHLORIDE SERPL-SCNC: 98 MMOL/L (ref 98–107)
CHOLEST SERPL-MCNC: 354 MG/DL
CREAT SERPL-MCNC: 1.03 MG/DL (ref 0.67–1.17)
EGFRCR SERPLBLD CKD-EPI 2021: 79 ML/MIN/1.73M2
EST. AVERAGE GLUCOSE BLD GHB EST-MCNC: 134 MG/DL
FASTING STATUS PATIENT QL REPORTED: YES
FASTING STATUS PATIENT QL REPORTED: YES
GLUCOSE SERPL-MCNC: 118 MG/DL (ref 70–99)
HBA1C MFR BLD: 6.3 % (ref 0–5.6)
HCO3 SERPL-SCNC: 27 MMOL/L (ref 22–29)
HDLC SERPL-MCNC: 40 MG/DL
LDLC SERPL CALC-MCNC: 284 MG/DL
NONHDLC SERPL-MCNC: 314 MG/DL
POTASSIUM SERPL-SCNC: 4.4 MMOL/L (ref 3.4–5.3)
PROT SERPL-MCNC: 8 G/DL (ref 6.4–8.3)
PSA SERPL DL<=0.01 NG/ML-MCNC: 1.65 NG/ML (ref 0–4.5)
SODIUM SERPL-SCNC: 137 MMOL/L (ref 135–145)
TRIGL SERPL-MCNC: 150 MG/DL

## 2025-01-16 RX ORDER — LISINOPRIL 20 MG/1
20 TABLET ORAL DAILY
Qty: 90 TABLET | Refills: 3 | Status: SHIPPED | OUTPATIENT
Start: 2025-01-16

## 2025-01-16 RX ORDER — BUDESONIDE AND FORMOTEROL FUMARATE DIHYDRATE 160; 4.5 UG/1; UG/1
2 AEROSOL RESPIRATORY (INHALATION) 2 TIMES DAILY PRN
Qty: 30.6 G | Refills: 1 | Status: SHIPPED | OUTPATIENT
Start: 2025-01-16

## 2025-01-16 RX ORDER — INHALER, ASSIST DEVICES
SPACER (EA) MISCELLANEOUS
Qty: 1 EACH | Refills: 0 | Status: SHIPPED | OUTPATIENT
Start: 2025-01-16

## 2025-01-16 ASSESSMENT — ASTHMA QUESTIONNAIRES: ACT_TOTALSCORE: 25

## 2025-01-16 NOTE — PATIENT INSTRUCTIONS
Patient Education   Preventive Care Advice   This is general advice given by our system to help you stay healthy. However, your care team may have specific advice just for you. Please talk to your care team about your preventive care needs.  Nutrition  Eat 5 or more servings of fruits and vegetables each day.  Try wheat bread, brown rice and whole grain pasta (instead of white bread, rice, and pasta).  Get enough calcium and vitamin D. Check the label on foods and aim for 100% of the RDA (recommended daily allowance).  Lifestyle  Exercise at least 150 minutes each week  (30 minutes a day, 5 days a week).  Do muscle strengthening activities 2 days a week. These help control your weight and prevent disease.  No smoking.  Wear sunscreen to prevent skin cancer.  Have a dental exam and cleaning every 6 months.  Yearly exams  See your health care team every year to talk about:  Any changes in your health.  Any medicines your care team has prescribed.  Preventive care, family planning, and ways to prevent chronic diseases.  Shots (vaccines)   HPV shots (up to age 26), if you've never had them before.  Hepatitis B shots (up to age 59), if you've never had them before.  COVID-19 shot: Get this shot when it's due.  Flu shot: Get a flu shot every year.  Tetanus shot: Get a tetanus shot every 10 years.  Pneumococcal, hepatitis A, and RSV shots: Ask your care team if you need these based on your risk.  Shingles shot (for age 50 and up)  General health tests  Diabetes screening:  Starting at age 35, Get screened for diabetes at least every 3 years.  If you are younger than age 35, ask your care team if you should be screened for diabetes.  Cholesterol test: At age 39, start having a cholesterol test every 5 years, or more often if advised.  Bone density scan (DEXA): At age 50, ask your care team if you should have this scan for osteoporosis (brittle bones).  Hepatitis C: Get tested at least once in your life.  STIs (sexually  transmitted infections)  Before age 24: Ask your care team if you should be screened for STIs.  After age 24: Get screened for STIs if you're at risk. You are at risk for STIs (including HIV) if:  You are sexually active with more than one person.  You don't use condoms every time.  You or a partner was diagnosed with a sexually transmitted infection.  If you are at risk for HIV, ask about PrEP medicine to prevent HIV.  Get tested for HIV at least once in your life, whether you are at risk for HIV or not.  Cancer screening tests  Cervical cancer screening: If you have a cervix, begin getting regular cervical cancer screening tests starting at age 21.  Breast cancer scan (mammogram): If you've ever had breasts, begin having regular mammograms starting at age 40. This is a scan to check for breast cancer.  Colon cancer screening: It is important to start screening for colon cancer at age 45.  Have a colonoscopy test every 10 years (or more often if you're at risk) Or, ask your provider about stool tests like a FIT test every year or Cologuard test every 3 years.  To learn more about your testing options, visit:   .  For help making a decision, visit:   https://bit.ly/xb88727.  Prostate cancer screening test: If you have a prostate, ask your care team if a prostate cancer screening test (PSA) at age 55 is right for you.  Lung cancer screening: If you are a current or former smoker ages 50 to 80, ask your care team if ongoing lung cancer screenings are right for you.  For informational purposes only. Not to replace the advice of your health care provider. Copyright   2023 [employer]. All rights reserved. Clinically reviewed by the [facility]. [employer] bone 208618 - REV 01/24.
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
## 2025-01-17 ENCOUNTER — TELEPHONE (OUTPATIENT)
Dept: FAMILY MEDICINE | Facility: CLINIC | Age: 70
End: 2025-01-17
Payer: COMMERCIAL

## 2025-01-17 PROBLEM — E78.01 FAMILIAL HYPERCHOLESTEREMIA: Status: ACTIVE | Noted: 2025-01-17

## 2025-01-17 NOTE — TELEPHONE ENCOUNTER
Central Prior Authorization Team   Phone: [phone]    PA Initiation    Medication: Repatha 140mg/ml  Insurance Company: [employer] Non-Specialty PA's - Phone [phone] Fax [phone]  Pharmacy Filling the Rx: [facility] - [address]  Filling Pharmacy Phone: [phone]  Filling Pharmacy Fax:    Start Date: 1/17/2025

## 2025-01-21 NOTE — TELEPHONE ENCOUNTER
Prior Authorization Approval    Authorization Effective Date: 12/18/2024  Authorization Expiration Date: 1/17/2026  Medication: Repatha 140mg/ml  Approved Dose/Quantity:   Reference #:     Insurance Company: [employer] Non-Specialty PA's - Phone [phone] Fax [phone]  Expected CoPay:       CoPay Card Available:      Foundation Assistance Needed:    Which Pharmacy is filling the prescription (Not needed for infusion/clinic administered): [facility] - [address]  Pharmacy Notified:  yes  Patient Notified:  yes- Pharmacy will contact patient when ready to /ship

## 2025-02-04 ENCOUNTER — TELEPHONE (OUTPATIENT)
Dept: FAMILY MEDICINE | Facility: CLINIC | Age: 70
End: 2025-02-04
Payer: COMMERCIAL

## 2025-02-04 DIAGNOSIS — E78.01 FAMILIAL HYPERCHOLESTEREMIA: ICD-10-CM

## 2025-02-04 NOTE — TELEPHONE ENCOUNTER
Reason for Call:  Medication or medication refill: Pharmacy Change    Do you use a [facility]?  [place] of the pharmacy and phone number for the current request:      [facility] [address] [place]     Name of the medication requested: evolocumab (REPATHA) 140 MG/ML prefilled autoinjector     Other request: [employer] notified patient they do not handle REPATHA.  Patient is requesting RX is sent to local pharmacy [facility] just for this prescription only.     Can we leave a detailed message on this number? YES - My chart message is just fine instead of phone call.       Call taken on 2/4/2025 at 1:05 PM by Justine Rivas

## 2025-02-20 ENCOUNTER — MYC MEDICAL ADVICE (OUTPATIENT)
Dept: FAMILY MEDICINE | Facility: CLINIC | Age: 70
End: 2025-02-20
Payer: COMMERCIAL

## 2025-03-18 ENCOUNTER — TRANSFERRED RECORDS (OUTPATIENT)
Dept: HEALTH INFORMATION MANAGEMENT | Facility: CLINIC | Age: 70
End: 2025-03-18
Payer: COMMERCIAL

## 2025-03-20 ENCOUNTER — OFFICE VISIT (OUTPATIENT)
Dept: FAMILY MEDICINE | Facility: CLINIC | Age: 70
End: 2025-03-20
Payer: COMMERCIAL

## 2025-03-20 VITALS
OXYGEN SATURATION: 98 % | SYSTOLIC BLOOD PRESSURE: 130 MMHG | TEMPERATURE: 97.8 F | HEIGHT: 69 IN | BODY MASS INDEX: 25.39 KG/M2 | HEART RATE: 62 BPM | WEIGHT: 171.4 LBS | RESPIRATION RATE: 16 BRPM | DIASTOLIC BLOOD PRESSURE: 80 MMHG

## 2025-03-20 DIAGNOSIS — E78.2 MIXED HYPERLIPIDEMIA: ICD-10-CM

## 2025-03-20 DIAGNOSIS — I10 ESSENTIAL HYPERTENSION, BENIGN: ICD-10-CM

## 2025-03-20 DIAGNOSIS — M25.561 RIGHT KNEE PAIN, UNSPECIFIED CHRONICITY: ICD-10-CM

## 2025-03-20 DIAGNOSIS — Z01.818 PREOP GENERAL PHYSICAL EXAM: Primary | ICD-10-CM

## 2025-03-20 LAB
ATRIAL RATE - MUSE: 63 BPM
DIASTOLIC BLOOD PRESSURE - MUSE: NORMAL MMHG
INTERPRETATION ECG - MUSE: NORMAL
P AXIS - MUSE: 25 DEGREES
PR INTERVAL - MUSE: 156 MS
QRS DURATION - MUSE: 82 MS
QT - MUSE: 396 MS
QTC - MUSE: 405 MS
R AXIS - MUSE: 9 DEGREES
SYSTOLIC BLOOD PRESSURE - MUSE: NORMAL MMHG
T AXIS - MUSE: 26 DEGREES
VENTRICULAR RATE- MUSE: 63 BPM

## 2025-03-20 ASSESSMENT — PAIN SCALES - GENERAL: PAINLEVEL_OUTOF10: NO PAIN (0)

## 2025-03-20 NOTE — PATIENT INSTRUCTIONS
How to Take Your Medication Before Surgery  Preoperative Medication Instructions   Antiplatelet or Anticoagulation Medication Instructions   - aspirin: Discontinue aspirin 7 days prior to procedure to reduce bleeding risk. It should be resumed postoperatively.     Additional Medication Instructions   - Lisinopril: DO NOT TAKE on day of surgery    - Avoid all supplements, vitamins, ibuprofen, naproxen until after surgery.        Patient Education   Preparing for Your Surgery  For Adults  Getting started  In most cases, a nurse will call to review your health history and instructions. They will give you an arrival time based on your scheduled surgery time. Please be ready to share:  Your doctor's clinic name and phone number  Your medical, surgical, and anesthesia history  A list of allergies and sensitivities  A list of medicines, including herbal treatments and over-the-counter drugs  Whether the patient has a legal guardian (ask how to send us the papers in advance)  Note: You may not receive a call if you were seen at our PAC (Preoperative Assessment Center).  Please tell us if you're pregnant--or if there's any chance you might be pregnant. Some surgeries may injure a fetus (unborn baby), so they require a pregnancy test. Surgeries that are safe for a fetus don't always need a test, and you can choose whether to have one.   Preparing for surgery  Within 10 to 30 days of surgery: Have a pre-op exam (sometimes called an H&P, or History and Physical). This can be done at a clinic or pre-operative center.  If you're having a , you may not need this exam. Talk to your care team.  At your pre-op exam, talk to your care team about all medicines you take. (This includes CBD oil and any drugs, such as THC, marijuana, and other forms of cannabis.) If you need to stop any medicine before surgery, ask when to start taking it again.  This is for your safety. Many medicines and drugs can make you bleed too much during  surgery. Some change how well surgery (anesthesia) drugs work.  Call your insurance company to let them know you're having surgery. (If you don't have insurance, call [phone].)  Call your clinic if there's any change in your health. This includes a scrape or scratch near the surgery site, or any signs of a cold (sore throat, runny nose, cough, rash, fever).  Eating and drinking guidelines  For your safety: Unless your surgeon tells you otherwise, follow the guidelines below.  Eat and drink as normal until 8 hours before you arrive for surgery. After that, no food or milk. You can spit out gum when you arrive.  Drink clear liquids until 2 hours before you arrive. These are liquids you can see through, like water, Gatorade, and Propel Water. They also include plain black coffee and tea (no cream or milk).  No alcohol for 24 hours before you arrive. The night before surgery, stop any drinks that contain THC.  If your care team tells you to take medicine on the morning of surgery, it's okay to take it with a sip of water. No other medicines or drugs are allowed (including CBD oil)--follow your care team's instructions.  If you have questions the day of surgery, call your hospital or surgery center.   Preventing infection  Shower or bathe the night before and the morning of surgery. Follow the instructions your clinic gave you. (If no instructions, use regular soap.)  Don't shave or clip hair near your surgery site. We'll remove the hair if needed.  Don't smoke or vape the morning of surgery. No chewing tobacco for 6 hours before you arrive. A nicotine patch is okay. You may spit out nicotine gum when you arrive.  For some surgeries, the surgeon will tell you to fully quit smoking and nicotine.  We will make every effort to keep you safe from infection. We will:  Clean our hands often with soap and water (or an alcohol-based hand rub).  Clean the skin at your surgery site with a special soap that kills germs.  Give  you a special gown to keep you warm. (Cold raises the risk of infection.)  Wear hair covers, masks, gowns, and gloves during surgery.  Give antibiotic medicine, if prescribed. Not all surgeries need this medicine.  What to bring on the day of surgery  Photo ID and insurance card  Copy of your health care directive, if you have one  Glasses and hearing aids (bring cases)  You can't wear contacts during surgery  Inhaler and eye drops, if you use them (tell us about these when you arrive)  CPAP machine or breathing device, if you use them  A few personal items, if spending the night  If you have . . .  A pacemaker, ICD (cardiac defibrillator), or other implant: Bring the ID card.  An implanted stimulator: Bring the remote control.  A legal guardian: Bring a copy of the certified (court-stamped) guardianship papers.  Please remove any jewelry, including body piercings. Leave jewelry and other valuables at home.  If you're going home the day of surgery  You must have a responsible adult drive you home. They should stay with you overnight as well.  If you don't have someone to stay with you, and you aren't safe to go home alone, we may keep you overnight. Insurance often won't pay for this.  After surgery  If it's hard to control your pain or you need more pain medicine, please call your surgeon's office.  Questions?   If you have any questions for your care team, list them here:   ____________________________________________________________________________________________________________________________________________________________________________________________________________________________________________________________  For informational purposes only. Not to replace the advice of your health care provider. Copyright   2003, 2019 [facility]. All rights reserved. Clinically reviewed by Bairon Hunter MD. [employer] [id number] - REV 08/24.

## 2025-03-20 NOTE — PROGRESS NOTES
Preoperative Evaluation  [facility]  [address]  [facility] [address]  Phone: [phone]  Fax: [phone]  Primary Provider: Davide James PA-C  Pre-op Performing Provider: DOTTIE Beauchamp CNP  Mar 20, 2025             3/19/2025   Surgical Information   What procedure is being done? Right Knee Arthroscopy median meniscectomy   Facility or Hospital where procedure/surgery will be performed: [facility] [place]   Who is doing the procedure / surgery? Dr. Jordan   Date of surgery / procedure: 3/24/2025   Time of surgery / procedure: 8:00 AM   Where do you plan to recover after surgery? at home with family     Fax number for surgical facility: [phone]    Assessment & Plan     The proposed surgical procedure is considered INTERMEDIATE risk.    Preop general physical exam  Right knee pain, unspecified chronicity  69 year old male with PMH of HTN, HLD, and asthma here for pre-operative exam for right knee arthroscopy and meniscus repair. He is in good health today. He stopped his aspirin 7 days prior to surgery as recommended. EKG without any acute changes today NSR without any T wave changes concerning for ischemia. OK to proceed with with surgery as scheduled.   - EKG 12-lead, tracing only      Mixed hyperlipidemia  Chronic. Recent increase in lipid panel, recently started on Repatha injections.     Benign Essential Hypertension  Chronic, well controlled. Continue lisinopril except hold morning of surgery.             - No identified additional risk factors other than previously addressed    Preoperative Medication Instructions  Antiplatelet or Anticoagulation Medication Instructions   - aspirin: Discontinue aspirin 7 days prior to procedure to reduce bleeding risk. It should be resumed postoperatively.     Additional Medication Instructions   - Lisinopril: DO NOT TAKE on day of surgery    - Avoid all supplements, vitamins, ibuprofen,  naproxen until after surgery.     Recommendation  Approval given to proceed with proposed procedure, without further diagnostic evaluation.    Subjective   Dorothy is a 69 year old, presenting for the following:  Pre-Op Exam (DOS 3/24)          3/20/2025     2:29 PM   Additional Questions   Roomed by Jagruti CRUZ:     Has been having right knee pain since Shayan day.     No chest pain, shortness of breath, cough, sore throat, fever, no dysuria.               3/19/2025   Pre-Op Questionnaire   Have you ever had a heart attack or stroke? No   Have you ever had surgery on your heart or blood vessels, such as a stent placement, a coronary artery bypass, or surgery on an artery in your head, neck, heart, or legs? No   Do you have chest pain with activity? No   Do you have a history of heart failure? No   Do you currently have a cold, bronchitis or symptoms of other infection? No   Do you have a cough, shortness of breath, or wheezing? No   Do you or anyone in your family have previous history of blood clots? No   Do you or does anyone in your family have a serious bleeding problem such as prolonged bleeding following surgeries or cuts? No   Have you ever had problems with anemia or been told to take iron pills? No   Have you had any abnormal blood loss such as black, tarry or bloody stools? No   Have you ever had a blood transfusion? No   Are you willing to have a blood transfusion if it is medically needed before, during, or after your surgery? Yes   Have you or any of your relatives ever had problems with anesthesia? No   Do you have sleep apnea, excessive snoring or daytime drowsiness? No   Do you have any artifical heart valves or other implanted medical devices like a pacemaker, defibrillator, or continuous glucose monitor? No   Do you have artificial joints? No   Are you allergic to latex? No     Health Care Directive  Patient does not have a Health Care Directive: Discussed advance care planning with patient;  however, patient declined at this time.    Preoperative Review of    reviewed - no record of controlled substances prescribed.      Status of Chronic Conditions:  See problem list for active medical problems.  Problems all longstanding and stable, except as noted/documented.  See ROS for pertinent symptoms related to these conditions.    Patient Active Problem List    Diagnosis Date Noted    Familial hypercholesteremia 01/17/2025     Priority: Medium    White coat syndrome with diagnosis of hypertension 01/16/2025     Priority: Medium    Mild intermittent asthma without complication 01/16/2025     Priority: Medium    Prediabetes 01/16/2025     Priority: Medium    Mixed hyperlipidemia 01/16/2025     Priority: Medium    Benign Essential Hypertension      Priority: Medium     Created by Conversion        Dyslipidemia      Priority: Medium     Created by Conversion        Webbed toes of both feet - harmless and likely to be passed on to your children 50% of the time. 10/26/2015     Priority: Medium    Sarcoidosis - found on lymph node biopsy 06/18/2015     Priority: Medium    Allergic Rhinitis Due To Pollen      Priority: Medium     Created by Conversion          Past Medical History:   Diagnosis Date    Hypertension      Past Surgical History:   Procedure Laterality Date    OH BIOPSY/EXCISION, LYMPH NODE(S)      Description: Biopsy Lymph Node;  Recorded: 09/02/2014;     Current Outpatient Medications   Medication Sig Dispense Refill    budesonide-formoterol (SYMBICORT) 160-4.5 MCG/ACT Inhaler Inhale 2 puffs into the lungs 2 times daily as needed (cough wheezing). 30.6 g 1    evolocumab (REPATHA) 140 MG/ML prefilled autoinjector Inject 1 mL (140 mg) subcutaneously every 14 days. 6 mL 1    fexofenadine (ALLEGRA) 180 MG tablet Take 180 mg by mouth as needed.      lisinopril (ZESTRIL) 20 MG tablet Take 1 tablet (20 mg) by mouth daily. 90 tablet 3    spacer (OPTICHAMBER LAURA) holding chamber Use with inhaler 1 each  "0    triamcinolone (NASACORT AQ) 55 mcg nasal inhaler [TRIAMCINOLONE (NASACORT AQ) 55 MCG NASAL INHALER] 2 sprays into each nostril daily.         Allergies   Allergen Reactions    Atorvastatin Unknown     Myalgias    Crestor [Rosuvastatin] Unknown     Myalgias        Social History     Tobacco Use    Smoking status: Never     Passive exposure: Never    Smokeless tobacco: Never   Substance Use Topics    Alcohol use: Yes     Comment: Alcoholic Drinks/day: Two beers per month.  Sometimes goes a few months without.     Family History   Problem Relation Age of Onset    Heart Disease Mother     Dementia Mother     Hyperlipidemia Father     Hypertension Father     No Known Problems Sister     No Known Problems Brother     No Known Problems Daughter     No Known Problems Son     Heart Disease Brother         Pacemaker    Diabetes Brother      History   Drug Use No             Review of Systems  Constitutional, neuro, ENT, endocrine, pulmonary, cardiac, gastrointestinal, genitourinary, musculoskeletal, integument and psychiatric systems are negative, except as otherwise noted.    Objective    /80 (BP Location: Right arm, Patient Position: Sitting, Cuff Size: Adult Regular)   Pulse 62   Temp 97.8  F (36.6  C) (Oral)   Resp 16   Ht 1.753 m (5' 9\")   Wt 77.7 kg (171 lb 6.4 oz)   SpO2 98%   BMI 25.31 kg/m     Estimated body mass index is 25.31 kg/m  as calculated from the following:    Height as of this encounter: 1.753 m (5' 9\").    Weight as of this encounter: 77.7 kg (171 lb 6.4 oz).  Physical Exam  GENERAL: alert and no distress  NECK: no adenopathy, no asymmetry, masses, or scars  RESP: lungs clear to auscultation - no rales, rhonchi or wheezes  CV: regular rate and rhythm, normal S1 S2, no S3 or S4, no murmur, click or rub, no peripheral edema  ABDOMEN: soft, nontender, no hepatosplenomegaly, no masses and bowel sounds normal  MS: no gross musculoskeletal defects noted, no edema    Recent Labs   Lab Test " 01/16/25  1026      POTASSIUM 4.4   CR 1.03   A1C 6.3*        Diagnostics  No labs were ordered during this visit.   EKG: appears normal, NSR, normal axis, normal intervals, no acute ST/T changes c/w ischemia, no LVH by voltage criteria, unchanged from previous tracings    Revised Cardiac Risk Index (RCRI)  The patient has the following serious cardiovascular risks for perioperative complications:   - No serious cardiac risks = 0 points     RCRI Interpretation: 0 points: Class I (very low risk - 0.4% complication rate)         Signed Electronically by: DOTTIE Beauchamp CNP  A copy of this evaluation report is provided to the requesting physician.

## 2025-04-08 ENCOUNTER — TRANSFERRED RECORDS (OUTPATIENT)
Dept: HEALTH INFORMATION MANAGEMENT | Facility: CLINIC | Age: 70
End: 2025-04-08
Payer: COMMERCIAL

## 2025-05-05 ENCOUNTER — TRANSFERRED RECORDS (OUTPATIENT)
Dept: HEALTH INFORMATION MANAGEMENT | Facility: CLINIC | Age: 70
End: 2025-05-05
Payer: COMMERCIAL

## 2025-05-11 ENCOUNTER — OFFICE VISIT (OUTPATIENT)
Dept: URGENT CARE | Facility: URGENT CARE | Age: 70
End: 2025-05-11
Payer: COMMERCIAL

## 2025-05-11 VITALS
RESPIRATION RATE: 20 BRPM | SYSTOLIC BLOOD PRESSURE: 207 MMHG | OXYGEN SATURATION: 96 % | HEART RATE: 64 BPM | TEMPERATURE: 96.9 F | DIASTOLIC BLOOD PRESSURE: 92 MMHG

## 2025-05-11 DIAGNOSIS — M10.9 ACUTE GOUT, UNSPECIFIED CAUSE, UNSPECIFIED SITE: Primary | ICD-10-CM

## 2025-05-11 PROCEDURE — 3080F DIAST BP >= 90 MM HG: CPT | Performed by: FAMILY MEDICINE

## 2025-05-11 PROCEDURE — 99213 OFFICE O/P EST LOW 20 MIN: CPT | Performed by: FAMILY MEDICINE

## 2025-05-11 PROCEDURE — 3077F SYST BP >= 140 MM HG: CPT | Performed by: FAMILY MEDICINE

## 2025-05-11 RX ORDER — PREDNISONE 10 MG/1
20 TABLET ORAL DAILY
Qty: 10 TABLET | Refills: 0 | Status: SHIPPED | OUTPATIENT
Start: 2025-05-11 | End: 2025-05-16

## 2025-05-11 NOTE — PATIENT INSTRUCTIONS
Stay well hydrated    Take the prednisone    Follow up if symptoms not resolving    When you get blood drawn next, ask them to include uric acid

## 2025-05-11 NOTE — PROGRESS NOTES
Urgent Care Clinic Visit    Chief Complaint   Patient presents with    Urgent Care     Rt 2nd digit toe pain x last night. Swollen, tender and soreness. No drainage. Pain with walking.               5/11/2025     9:58 AM   Additional Questions   Roomed by Ruddy Llanes MA   Accompanied by Self         5/11/2025   Declines Weight   Did patient decline having their weight taken? Yes         Ruddy Llanes MA on 05/11/2025 at 10:01 AM      Started last night, at home    Last night worse, hurt to have sheet on it    No history of this     No trauma    Retired    No unusual activities    Exercise some, back to gym last week    Slowly getting into it    Had meniscus procedure right knee    Parents had gout     Right 2nd toe affected    Full physical not done     Mentation and affect are fine    No tremor of speech or extremity    Patient has red distal 2nd right toe  Very tender  Only mildly swollen    Other toes and remainder of foot are fine    Strength seems okay but tender on testing    ASSESSMENT / PLAN:  (M10.9) Acute gout, unspecified cause, unspecified site  (primary encounter diagnosis)  Comment: this is most likely gout flare.  Discussed in detail.  Patient has strong family history of this.    Plan: predniSONE (DELTASONE) 10 MG tablet        Discussed in detail.      Follow up if not resolving    Advised he get uric acid level checked at next blood draw    Gave gout handouts        I reviewed the patient's medications, allergies, medical history, family history, and social history.    Austin Burdick MD

## 2025-07-22 ENCOUNTER — LAB (OUTPATIENT)
Dept: LAB | Facility: CLINIC | Age: 70
End: 2025-07-22
Payer: COMMERCIAL

## 2025-07-22 DIAGNOSIS — R73.03 PREDIABETES: ICD-10-CM

## 2025-07-22 DIAGNOSIS — E78.01 FAMILIAL HYPERCHOLESTEREMIA: ICD-10-CM

## 2025-07-22 DIAGNOSIS — E78.2 MIXED HYPERLIPIDEMIA: ICD-10-CM

## 2025-07-22 LAB
CHOLEST SERPL-MCNC: 143 MG/DL
EST. AVERAGE GLUCOSE BLD GHB EST-MCNC: 126 MG/DL
FASTING STATUS PATIENT QL REPORTED: YES
HBA1C MFR BLD: 6 % (ref 0–5.6)
HDLC SERPL-MCNC: 41 MG/DL
LDLC SERPL CALC-MCNC: 81 MG/DL
NONHDLC SERPL-MCNC: 102 MG/DL
TRIGL SERPL-MCNC: 105 MG/DL

## 2025-07-22 PROCEDURE — 83036 HEMOGLOBIN GLYCOSYLATED A1C: CPT

## 2025-07-22 PROCEDURE — 36415 COLL VENOUS BLD VENIPUNCTURE: CPT

## 2025-07-22 PROCEDURE — 80061 LIPID PANEL: CPT

## 2025-08-05 ENCOUNTER — TRANSFERRED RECORDS (OUTPATIENT)
Dept: HEALTH INFORMATION MANAGEMENT | Facility: CLINIC | Age: 70
End: 2025-08-05
Payer: COMMERCIAL

## 2025-09-02 ENCOUNTER — TRANSFERRED RECORDS (OUTPATIENT)
Dept: HEALTH INFORMATION MANAGEMENT | Facility: CLINIC | Age: 70
End: 2025-09-02
Payer: COMMERCIAL